# Patient Record
Sex: MALE | Race: WHITE | NOT HISPANIC OR LATINO | URBAN - METROPOLITAN AREA
[De-identification: names, ages, dates, MRNs, and addresses within clinical notes are randomized per-mention and may not be internally consistent; named-entity substitution may affect disease eponyms.]

---

## 2022-08-03 ENCOUNTER — OFFICE VISIT (OUTPATIENT)
Dept: FAMILY MEDICINE | Facility: CLINIC | Age: 47
End: 2022-08-03
Payer: COMMERCIAL

## 2022-08-03 VITALS
TEMPERATURE: 97.7 F | DIASTOLIC BLOOD PRESSURE: 73 MMHG | WEIGHT: 157.6 LBS | OXYGEN SATURATION: 97 % | BODY MASS INDEX: 23.89 KG/M2 | SYSTOLIC BLOOD PRESSURE: 114 MMHG | HEART RATE: 76 BPM | HEIGHT: 68 IN

## 2022-08-03 DIAGNOSIS — Z23 ENCOUNTER FOR IMMUNIZATION: ICD-10-CM

## 2022-08-03 DIAGNOSIS — L73.9 FOLLICULITIS: ICD-10-CM

## 2022-08-03 DIAGNOSIS — Z76.89 ENCOUNTER TO ESTABLISH CARE: Primary | ICD-10-CM

## 2022-08-03 DIAGNOSIS — Z00.00 HEALTHCARE MAINTENANCE: ICD-10-CM

## 2022-08-03 DIAGNOSIS — F64.0 GENDER DYSPHORIA IN ADULT: ICD-10-CM

## 2022-08-03 LAB
ALBUMIN SERPL BCG-MCNC: 4.4 G/DL (ref 3.5–5.2)
ALP SERPL-CCNC: 83 U/L (ref 35–129)
ALT SERPL W P-5'-P-CCNC: 31 U/L (ref 10–50)
ANION GAP SERPL CALCULATED.3IONS-SCNC: 10 MMOL/L (ref 7–15)
AST SERPL W P-5'-P-CCNC: 32 U/L (ref 10–50)
BILIRUB SERPL-MCNC: 0.3 MG/DL
BUN SERPL-MCNC: 16.2 MG/DL (ref 6–20)
CALCIUM SERPL-MCNC: 9.3 MG/DL (ref 8.6–10)
CHLORIDE SERPL-SCNC: 101 MMOL/L (ref 98–107)
CHOLEST SERPL-MCNC: 174 MG/DL
CREAT SERPL-MCNC: 0.7 MG/DL (ref 0.51–1.17)
DEPRECATED HCO3 PLAS-SCNC: 23 MMOL/L (ref 22–29)
ERYTHROCYTE [DISTWIDTH] IN BLOOD BY AUTOMATED COUNT: 13 % (ref 10–15)
ESTRADIOL SERPL-MCNC: 217 PG/ML
GFR SERPL CREATININE-BSD FRML MDRD: >90 ML/MIN/1.73M2
GLUCOSE SERPL-MCNC: 94 MG/DL (ref 70–99)
HBA1C MFR BLD: 5 % (ref 0–5.6)
HCT VFR BLD AUTO: 41 % (ref 35–53)
HDLC SERPL-MCNC: 61 MG/DL
HGB BLD-MCNC: 14 G/DL (ref 11.7–17.7)
HOLD SPECIMEN: NORMAL
LDLC SERPL CALC-MCNC: 100 MG/DL
MCH RBC QN AUTO: 33.3 PG (ref 26.5–33)
MCHC RBC AUTO-ENTMCNC: 34.1 G/DL (ref 31.5–36.5)
MCV RBC AUTO: 97 FL (ref 78–100)
NONHDLC SERPL-MCNC: 113 MG/DL
PLATELET # BLD AUTO: 250 10E3/UL (ref 150–450)
POTASSIUM SERPL-SCNC: 4 MMOL/L (ref 3.4–5.3)
PROT SERPL-MCNC: 7.4 G/DL (ref 6.4–8.3)
RBC # BLD AUTO: 4.21 10E6/UL (ref 3.8–5.9)
SODIUM SERPL-SCNC: 134 MMOL/L (ref 136–145)
TRIGL SERPL-MCNC: 63 MG/DL
WBC # BLD AUTO: 7 10E3/UL (ref 4–11)

## 2022-08-03 PROCEDURE — 83036 HEMOGLOBIN GLYCOSYLATED A1C: CPT | Performed by: STUDENT IN AN ORGANIZED HEALTH CARE EDUCATION/TRAINING PROGRAM

## 2022-08-03 PROCEDURE — 80053 COMPREHEN METABOLIC PANEL: CPT | Performed by: STUDENT IN AN ORGANIZED HEALTH CARE EDUCATION/TRAINING PROGRAM

## 2022-08-03 PROCEDURE — 82672 ASSAY OF ESTROGEN: CPT | Mod: 90 | Performed by: STUDENT IN AN ORGANIZED HEALTH CARE EDUCATION/TRAINING PROGRAM

## 2022-08-03 PROCEDURE — 86803 HEPATITIS C AB TEST: CPT | Performed by: STUDENT IN AN ORGANIZED HEALTH CARE EDUCATION/TRAINING PROGRAM

## 2022-08-03 PROCEDURE — 80061 LIPID PANEL: CPT | Performed by: STUDENT IN AN ORGANIZED HEALTH CARE EDUCATION/TRAINING PROGRAM

## 2022-08-03 PROCEDURE — 85027 COMPLETE CBC AUTOMATED: CPT | Performed by: STUDENT IN AN ORGANIZED HEALTH CARE EDUCATION/TRAINING PROGRAM

## 2022-08-03 PROCEDURE — 36415 COLL VENOUS BLD VENIPUNCTURE: CPT | Performed by: STUDENT IN AN ORGANIZED HEALTH CARE EDUCATION/TRAINING PROGRAM

## 2022-08-03 PROCEDURE — 84403 ASSAY OF TOTAL TESTOSTERONE: CPT | Mod: KX | Performed by: STUDENT IN AN ORGANIZED HEALTH CARE EDUCATION/TRAINING PROGRAM

## 2022-08-03 PROCEDURE — 90471 IMMUNIZATION ADMIN: CPT | Performed by: STUDENT IN AN ORGANIZED HEALTH CARE EDUCATION/TRAINING PROGRAM

## 2022-08-03 PROCEDURE — 82670 ASSAY OF TOTAL ESTRADIOL: CPT | Mod: KX | Performed by: STUDENT IN AN ORGANIZED HEALTH CARE EDUCATION/TRAINING PROGRAM

## 2022-08-03 PROCEDURE — 87389 HIV-1 AG W/HIV-1&-2 AB AG IA: CPT | Performed by: STUDENT IN AN ORGANIZED HEALTH CARE EDUCATION/TRAINING PROGRAM

## 2022-08-03 PROCEDURE — 99214 OFFICE O/P EST MOD 30 MIN: CPT | Mod: 25 | Performed by: STUDENT IN AN ORGANIZED HEALTH CARE EDUCATION/TRAINING PROGRAM

## 2022-08-03 PROCEDURE — 90715 TDAP VACCINE 7 YRS/> IM: CPT | Performed by: STUDENT IN AN ORGANIZED HEALTH CARE EDUCATION/TRAINING PROGRAM

## 2022-08-03 RX ORDER — ESTRADIOL VALERATE 20 MG/ML
20 INJECTION INTRAMUSCULAR WEEKLY
COMMUNITY
End: 2022-09-12

## 2022-08-03 NOTE — PATIENT INSTRUCTIONS
Thank you for coming in,     We'll check labs today. This will include your liver labs, I'll message you with results.     Dove plain unscented soaps, plain lotion like vanicream. We'll see how that helps.     Shayna Ash MD    .

## 2022-08-03 NOTE — PROGRESS NOTES
[facility] visit    Assessment and Plan     Blayne is a 47 year old other who presents with: Derm Problem (Pt noticed a rash in June which progressively got worse. Pt has tried hydrocortisone and it did not work. Pt reports tea tree oil helped bring down some of the inflammation. Rash is located in the torso, check and some in the back.)      Blayne was seen today for derm problem and to establish care.  Wanting baseline labs to note state of health, did have an episode 7 years ago where they drank heavily for about a year, has been worried about her liver ever since.    Diagnoses and all orders for this visit:    Encounter to establish care  Labs pending.  -     Comprehensive metabolic panel; Future  -     CBC with Platelets and Reflex to Iron Studies; Future  -     Lipid panel reflex to direct LDL Non-fasting; Future  -     Hepatitis C Screen Reflex to HCV RNA Quant and Genotype; Future  -     HIV Antigen Antibody Combo; Future  -     Hemoglobin A1c; Future  -     Cancel: Estrogens total; Future  -     Testosterone total; Future  -     Comprehensive metabolic panel  -     CBC with Platelets and Reflex to Iron Studies  -     Lipid panel reflex to direct LDL Non-fasting  -     Hepatitis C Screen Reflex to HCV RNA Quant and Genotype  -     HIV Antigen Antibody Combo  -     Hemoglobin A1c  -     Estrogens total  -     Testosterone total    Healthcare maintenance    Gender dysphoria in adult  Patient is sometimes using the name Kiki, has started estrogen in July with an online provider.  No laboratory tests on file.  -     Hemoglobin A1c; Future  -     Cancel: Estrogens total; Future  -     Testosterone total; Future  -     Hemoglobin A1c  -     Estrogens total  -     Testosterone total  -     Estradiol; Future    Encounter for immunization  -     TDAP VACCINE (Adacel, Boostrix)  [[id number]]    Folliculitis  Mild skin irritation consistent with folliculitis, scattered isolated papules without surrounding erythema,  signs of excoriation.  Most consistent with body acne versus folliculitis.  Discussed use of hypoallergenic soaps, unscented lotions like Vanicream.  She has already removed perfumes and dyes from her washing and soaps however was recently uses scented body lotion.  This may also in part be a response to starting hormones, will monitor for response and change in regimen.      No follow-ups on file.    Options for treatment and follow-up care were reviewed with the patient/caregivers. They engaged in the decision making process and verbalized understanding of the options discussed and agreed with the final plan.    There are no discontinued medications.    SANDEEP Sosa MD PG3      Subjective      History obtained from patient, who has not been consistently seeking care for the last 20 years so JORGE ALBERTO was not collected  Patient speaks English,  was not present for this visit.      DWIGHT Noyola is a 47 year old adult, who presents with:  Chief Complaint   Patient presents with     Derm Problem     Pt noticed a rash in June which progressively got worse. Pt has tried hydrocortisone and it did not work. Pt reports tea tree oil helped bring down some of the inflammation. Rash is located in the torso, check and some in the back.     Patient establishing care.    Significant history of anxiety, currently feels that it is fairly well managed.  Is doing a lot better since starting estrogen a on July 4.  Already seeing some skin and chest changes.  Is getting this care through an alternate source.  Mom is moderately supportive, dad is hostile and also has Alzheimer's which is changing his behaviors.  Patient is working evening shift which is quite a relief, previously been working overnights.    Patient is a new patient to this clinic and so I reviewed and updated the problem, medication and allergy lists, as well as past, surgical, family and social history.      There is no problem list on file for this  "patient.    Current Outpatient Medications   Medication     estradiol valerate (DELESTROGEN) 20 MG/ML injection     No current facility-administered medications for this visit.        Allergies   Allergen Reactions     Fish-Derived Products Anaphylaxis and Hives     pt states   Lou Fox RN...............6/11/2020 5:25 PM       History reviewed. No pertinent past medical history.  History reviewed. No pertinent surgical history.    Social History     Socioeconomic History     Marital status: Single     Spouse name: None     Number of children: None     Years of education: None     Highest education level: None        ROS  10-point ROS negative except as described above.      Objective     Vital signs  /73   Pulse 76   Temp 97.7  F (36.5  C) (Oral)   Ht 1.726 m (5' 7.95\")   Wt 71.5 kg (157 lb 9.6 oz)   SpO2 97%   BMI 24.00 kg/m      Vitals were reviewed and were normal     PE  Vital signs reviewed  Constitutional: Age appropriate human well kempt, no acute distress  HENT: Sclera non-icteric and not injected, pink conjunctivae  Cardiac: Regular rate  Resp: Speaking in full sentences on room air, no respiratory distress  Skin: Warm, dry, Mild skin irritation consistent with folliculitis, scattered isolated papules without surrounding erythema, signs of excoriation.   Msk: Ambulates independently, full range of gesture  Neuro: Alert and oriented, movements coordinated and symmetric, appropriate gait  Psych: Affect appropriate to conversation and situation. Initially anxious, improves with visit.       Results    Results from this or last visit  Results for orders placed or performed in visit on 08/03/22   Hemoglobin A1c     Status: Normal   Result Value Ref Range    Hemoglobin A1C 5.0 0.0 - 5.6 %   Extra Green Top (Lithium Heparin) Tube     Status: None   Result Value Ref Range    Hold Specimen JIC    CBC with Platelets and Reflex to Iron Studies     Status: None (In process)    Narrative    The " following orders were created for panel order CBC with Platelets and Reflex to Iron Studies.  Procedure                               Abnormality         Status                     ---------                               -----------         ------                     CBC with Platelets and R...[[id number]]                      In process                 Extra Green Top (Lithium...[[id number]]                      Final result                 Please view results for these tests on the individual orders.

## 2022-08-03 NOTE — LETTER
August 8, 2022      DWIGHT Noyola  [address] [place] [address]        Dear  Jazmín,    We are writing to inform you of your test results.    To start with, your kidney and liver function both look good and healthy.  Your cholesterol is within desirable limits which suggests to me that you are getting good food and moving your body well enough to keep you quite healthy.  You are negative for HIV, hepatitis C so there is no treatment that needs to be done here.    Your testosterone is suppressed and your estradiol is within goal range.    I know you are very anxious about making sure that you are healthy, you have done very well at keeping herself healthy at least according to the things that we measure in your blood in the lab.  I hope this is some reassurance for you, we are here whenever you have questions or other concerns.    Resulted Orders   Comprehensive metabolic panel   Result Value Ref Range    Sodium 134 (L) 136 - 145 mmol/L    Potassium 4.0 3.4 - 5.3 mmol/L    Creatinine 0.70 0.51 - 1.17 mg/dL      Comment:      Male and Female  0-2 Months    0.31-0.88 mg/dL  2-12 Months   0.16-0.39 mg/dL  1-2 Years     0.18-0.35 mg/dL  3-4 Years     0.26-0.42 mg/dL  5-6 Years     0.29-0.47 mg/dL  7-8 Years     0.34-0.53 mg/dL  9-10 Years    0.33-0.64 mg/dL  11-12 Years   0.44-0.68 mg/dL  13-14 Years   0.46-0.77 mg/dL    Female  15 Years and older  0.51-0.95 mg/dL    Male  15 Years and older  0.67-1.17 mg/dL        Urea Nitrogen 16.2 6.0 - 20.0 mg/dL    Chloride 101 98 - 107 mmol/L    Carbon Dioxide (CO2) 23 22 - 29 mmol/L    Anion Gap 10 7 - 15 mmol/L    Glucose 94 70 - 99 mg/dL    Calcium 9.3 8.6 - 10.0 mg/dL    Protein Total 7.4 6.4 - 8.3 g/dL    Albumin 4.4 3.5 - 5.2 g/dL    Bilirubin Total 0.3 <=1.2 mg/dL    Alkaline Phosphatase 83 35 - 129 U/L      Comment:      Female:    0-15 days     U/L  15d-1 year   122-469 U/L  1-10 years   142-335 U/L  10-13 years  129-417 U/L  13-15 years   U/L  15-17  years   U/L  17-19 years  45-87 U/L  19 years and older   U/L      Male:  0-15 days     U/L  15d-1 year   122-469 U/L  1-10 years   142-335 U/L  10-13 years  129-417 U/L  13-15 years  116-468 U/L  15-17 years   U/L  17-19 years   U/L  19 years and older   U/L      AST 32 10 - 50 U/L      Comment:      Female   All ages 10-35 U/L     Male   All ages 10-50 U/L        ALT 31 10 - 50 U/L      Comment:      Female   All ages 10-35 U/L     Male   All ages 10-50 U/L        GFR Estimate >90 >60 mL/min/1.73m2      Comment:      GFR not calculated when sex unspecified or nonbinary.  Effective December 21, 2021 eGFRcr in adults is calculated using the 2021 CKD-EPI creatinine equation which includes age and gender (Ivan et al., NEJ, DOI: 10.1056/IACKvj7748865)    Narrative    The sex of this patient cannot be reliably determined based on discrepancies in demographics (legal sex, sex assigned at birth, gender identity).  Both male and female reference ranges are provided where applicable.  Careful evaluation of the patient s results as compared to the gender specific reference intervals is required in this setting.    Lipid panel reflex to direct LDL Non-fasting   Result Value Ref Range    Cholesterol 174 <200 mg/dL    Triglycerides 63 <150 mg/dL    Direct Measure HDL 61 >=40 mg/dL    LDL Cholesterol Calculated 100 <=100 mg/dL    Non HDL Cholesterol 113 <130 mg/dL    Narrative    Cholesterol  Desirable:  <200 mg/dL    Triglycerides  Normal:  Less than 150 mg/dL  Borderline High:  150-199 mg/dL  High:  200-499 mg/dL  Very High:  Greater than or equal to 500 mg/dL    Direct Measure HDL  Female:  Greater than or equal to 50 mg/dL   Male:  Greater than or equal to 40 mg/dL    LDL Cholesterol  Desirable:  <100mg/dL  Above Desirable:  100-129 mg/dL   Borderline High:  130-159 mg/dL   High:  160-189 mg/dL   Very High:  >= 190 mg/dL    Non HDL Cholesterol  Desirable:  130 mg/dL  Above Desirable:   130-159 mg/dL  Borderline High:  160-189 mg/dL  High:  190-219 mg/dL  Very High:  Greater than or equal to 220 mg/dL   Hepatitis C Screen Reflex to HCV RNA Quant and Genotype   Result Value Ref Range    Hepatitis C Antibody Nonreactive Nonreactive    Narrative    Assay performance characteristics have not been established for newborns, infants, and children.   HIV Antigen Antibody Combo   Result Value Ref Range    HIV Antigen Antibody Combo Nonreactive Nonreactive      Comment:      HIV-1 p24 Ag & HIV-1/HIV-2 Ab Not Detected   Hemoglobin A1c   Result Value Ref Range    Hemoglobin A1C 5.0 0.0 - 5.6 %      Comment:      Normal <5.7%   Prediabetes 5.7-6.4%    Diabetes 6.5% or higher     Note: Adopted from ADA consensus guidelines.   Estrogens total   Result Value Ref Range    See Scanned Result ESTROGENS TOTAL-Scanned (A)    Testosterone total   Result Value Ref Range    Testosterone Total 10 8 - 950 ng/dL    Narrative    The sex of this patient cannot be reliably determined based on discrepancies in demographics (legal sex, sex assigned at birth, gender identity).  Both male and female reference ranges are provided where applicable.  Careful evaluation of the patient s results as compared to the gender specific reference intervals is required in this setting.    CBC with Platelets and Reflex to Iron Studies   Result Value Ref Range    WBC Count 7.0 4.0 - 11.0 10e3/uL    RBC Count 4.21 3.80 - 5.90 10e6/uL      Comment:      Sex Specific Reference Ranges:    Female  3.80-5.20  10e6/uL  Male    4.40-5.90  10e6/uL        Hemoglobin 14.0 11.7 - 17.7 g/dL      Comment:      Sex Specific Reference Ranges:    Female  11.7-15.7  g/dL  Male    13.3-17.7   g/dL      Hematocrit 41.0 35.0 - 53.0 %      Comment:      Sex Specific Reference Ranges:     Female  35.0-47.0 %   Male      40.0-53.0 %      MCV 97 78 - 100 fL    MCH 33.3 (H) 26.5 - 33.0 pg    MCHC 34.1 31.5 - 36.5 g/dL    RDW 13.0 10.0 - 15.0 %    Platelet Count 250 150 -  450 10e3/uL    Narrative    The sex of this patient cannot be reliably determined based on discrepancies in demographics (legal sex, sex assigned at birth, gender identity).  Both male and female reference ranges are provided where applicable.  Careful evaluation of the patient s results as compared to the gender specific reference intervals is required in this setting.    Extra Green Top (Lithium Heparin) Tube   Result Value Ref Range    Hold Specimen JI    Estradiol   Result Value Ref Range    Estradiol 217 pg/mL      Comment:      Healthy Men:   11.3-43.2 pg/mL    Healthy Postmenopausal Women:  Postmenopause: <5-138 pg/mL    Healthy Pregnant Women:  1st trimester: 154-3243 pg/mL  2nd trimester: 1561-65995 pg/mL  3rd trimester: 8525->93346 pg/mL    Healthy Women Cycle Phase:  Follicular: 30.9-90.4 pg/mL  Ovulation: 60.4-533 pg/mL  Luteal: 60.4-232 pg/mL    Healthy Women Cycle Sub-Phase:  Early Follicular: 20.5-62.8 pg/mL  Intermediate Follicular: 26-79.8 pg/mL  Late Follicular: 49.5-233 pg/mL  Ovulation: 60.4-602 pg/mL  Early Luteal: 51.1-179 pg/mL  Intermediate Luteal: 66.5-305 pg/mL  Late Luteal: 30.2-222 pg/mL       If you have any questions or concerns, please call the clinic at the number listed above.       Sincerely,      Shayna Sosa MD

## 2022-08-04 LAB
HCV AB SERPL QL IA: NONREACTIVE
HIV 1+2 AB+HIV1 P24 AG SERPL QL IA: NONREACTIVE

## 2022-08-05 LAB — TESTOST SERPL-MCNC: 10 NG/DL (ref 8–950)

## 2022-08-08 LAB — SCANNED LAB RESULT: ABNORMAL

## 2022-08-28 ENCOUNTER — HEALTH MAINTENANCE LETTER (OUTPATIENT)
Age: 47
End: 2022-08-28

## 2022-09-09 ENCOUNTER — TELEPHONE (OUTPATIENT)
Dept: FAMILY MEDICINE | Facility: CLINIC | Age: 47
End: 2022-09-09

## 2022-09-09 ENCOUNTER — MYC MEDICAL ADVICE (OUTPATIENT)
Dept: FAMILY MEDICINE | Facility: CLINIC | Age: 47
End: 2022-09-09

## 2022-09-09 DIAGNOSIS — F64.0 GENDER DYSPHORIA IN ADULT: Primary | ICD-10-CM

## 2022-09-09 NOTE — TELEPHONE ENCOUNTER
"[facility] phone call message- patient requesting a refill:    Full Medication Name: 25g x 1\"  Needle       Pharmacy confirmed as     [id number] - [place]  [facility] & [address] [place] [address]  Phone: [phone] Fax: [phone]    : Yes    Additional Comments: Dr Sosa have not prescribed this before. The patient used to get the needles from another source but now the patient will like to only get them from Dr Sosa. The patient is out as of right now.    OK to leave a message on voice mail? Yes    Primary language: English      needed? No    Call taken on September 9, 2022 at 8:34 AM by Abbey Steward  "

## 2022-09-12 ENCOUNTER — OFFICE VISIT (OUTPATIENT)
Dept: FAMILY MEDICINE | Facility: CLINIC | Age: 47
End: 2022-09-12
Payer: COMMERCIAL

## 2022-09-12 VITALS
BODY MASS INDEX: 23.73 KG/M2 | HEART RATE: 77 BPM | WEIGHT: 160.2 LBS | OXYGEN SATURATION: 98 % | DIASTOLIC BLOOD PRESSURE: 75 MMHG | SYSTOLIC BLOOD PRESSURE: 119 MMHG | TEMPERATURE: 98.2 F | HEIGHT: 69 IN

## 2022-09-12 DIAGNOSIS — F64.0 GENDER DYSPHORIA IN ADULT: Primary | ICD-10-CM

## 2022-09-12 DIAGNOSIS — Z23 NEED FOR PROPHYLACTIC VACCINATION AND INOCULATION AGAINST INFLUENZA: ICD-10-CM

## 2022-09-12 PROCEDURE — 99214 OFFICE O/P EST MOD 30 MIN: CPT | Mod: 25 | Performed by: STUDENT IN AN ORGANIZED HEALTH CARE EDUCATION/TRAINING PROGRAM

## 2022-09-12 PROCEDURE — 90471 IMMUNIZATION ADMIN: CPT | Performed by: STUDENT IN AN ORGANIZED HEALTH CARE EDUCATION/TRAINING PROGRAM

## 2022-09-12 PROCEDURE — 90686 IIV4 VACC NO PRSV 0.5 ML IM: CPT | Performed by: STUDENT IN AN ORGANIZED HEALTH CARE EDUCATION/TRAINING PROGRAM

## 2022-09-12 RX ORDER — ESTRADIOL VALERATE 20 MG/ML
6 INJECTION INTRAMUSCULAR WEEKLY
COMMUNITY
Start: 2022-09-12 | End: 2022-09-23

## 2022-09-12 RX ORDER — SYRINGE WITH NEEDLE, 1 ML 25GX5/8"
1 SYRINGE, EMPTY DISPOSABLE MISCELLANEOUS WEEKLY
COMMUNITY
Start: 2022-06-29 | End: 2023-10-18

## 2022-09-12 RX ORDER — NEEDLES, DISPOSABLE 25GX5/8"
1 NEEDLE, DISPOSABLE MISCELLANEOUS WEEKLY
COMMUNITY
Start: 2022-06-29 | End: 2022-09-12

## 2022-09-12 RX ORDER — NEEDLES, DISPOSABLE 25GX5/8"
1 NEEDLE, DISPOSABLE MISCELLANEOUS WEEKLY
Qty: 100 EACH | Refills: 3 | Status: SHIPPED | OUTPATIENT
Start: 2022-09-12 | End: 2023-10-18

## 2022-09-12 RX ORDER — NEEDLES, DISPOSABLE 25GX5/8"
NEEDLE, DISPOSABLE MISCELLANEOUS
Qty: 100 EACH | Refills: 3 | Status: SHIPPED | OUTPATIENT
Start: 2022-09-12

## 2022-09-12 NOTE — PROGRESS NOTES
Preceptor Attestation:    I discussed the patient with the resident and evaluated the patient in person. I have verified the content of the note, which accurately reflects my assessment of the patient and the plan of care.   Supervising Physician:  Kika Moore DO.

## 2022-09-12 NOTE — PATIENT INSTRUCTIONS
It was maicol to see you today!    I refilled all your medications, just let me know if anything needs fixing or is not what you expect.  Would like to at which time we will check labs again and check in on how you are feeling after this amount of time on your medication.    I am so glad that dinner yesterday went well and that things are going well with your mom!  Please let us know if you have any other questions or anything else comes up.    Shayna Ash MD

## 2022-09-12 NOTE — PROGRESS NOTES
"  Assessment & Plan     Gender dysphoria in adult  Patient presenting to establish care.  Reviewed medications and dosages, corrected dose of estradiol valerate which is 0.3 cc/week or approximately 6 mg.  Patient is not having any difficulty with injections.  At this time is interested in just staying on current regimen, we did discuss when in the course of a transition 1 might introduce an androgen blocker or progesterone, patient is not in a hurry.  We discussed the timeline of HRT which is in terms of puberty so over the course of months to years.  All questions were answered, I should see her in about 2 months for labs and in person visit.  - BD HYPODERMIC NEEDLE 18G X 1\" MISC; Inject 1 Syringe into the muscle once a week  - syringe, disposable, (BioCatch SYRINGE LUER LOCK) 1 ML MISC; Use for weekly estrogen injections    Need for prophylactic vaccination and inoculation against influenza  Patient is due for flu vaccination, this was provided.  - INFLUENZA VACCINE IM > 6 MONTHS VALENT IIV4 (AFLURIA/FLUZONE)      Return in about 2 months (around 11/12/2022).    Shayna Ash MD  [facility] RAYSA Lopez is a 47 year old, presenting for the following health issues:  RECHECK (Pt present for gender affirming care)      GAYE Swnason is presenting to finish establishing care with our clinic.  She will be getting her gender services as well as her HRT through us.  Yesterday she went to a family event dressed and is Kiki and everything went well, her mother is now calling her Kiki also.  She is pleased to be established at this clinic and is very invested in protecting her health as long as possible especially now that she is transitioning.    Review of Systems   See HPI      Objective    /75   Pulse 77   Temp 98.2  F (36.8  C) (Oral)   Ht 1.746 m (5' 8.74\")   Wt 72.7 kg (160 lb 3.2 oz)   SpO2 98%   BMI 23.84 kg/m    Body mass index is 23.84 " kg/m .  Physical Exam   Vital signs reviewed  Constitutional: Age appropriate human well kempt, no acute distress, well-groomed with excellent jewelry and earrings  HENT: Sclera non-icteric and not injected, pink conjunctivae  Cardiac: Regular rate  Resp: Speaking in full sentences on room air, no respiratory distress   Skin: Warm, dry   Msk: Ambulates independently, full range of gesture  Neuro: Alert and oriented, movements coordinated and symmetric, appropriate gait  Psych: Affect appropriate to conversation and situation       Office Visit on 08/03/2022   Component Date Value Ref Range Status     Sodium 08/03/2022 134 (A) 136 - 145 mmol/L Final     Potassium 08/03/2022 4.0  3.4 - 5.3 mmol/L Final     Creatinine 08/03/2022 0.70  0.51 - 1.17 mg/dL Final    Male and Female  0-2 Months    0.31-0.88 mg/dL  2-12 Months   0.16-0.39 mg/dL  1-2 Years     0.18-0.35 mg/dL  3-4 Years     0.26-0.42 mg/dL  5-6 Years     0.29-0.47 mg/dL  7-8 Years     0.34-0.53 mg/dL  9-10 Years    0.33-0.64 mg/dL  11-12 Years   0.44-0.68 mg/dL  13-14 Years   0.46-0.77 mg/dL    Female  15 Years and older  0.51-0.95 mg/dL    Male  15 Years and older  0.67-1.17 mg/dL         Urea Nitrogen 08/03/2022 16.2  6.0 - 20.0 mg/dL Final     Chloride 08/03/2022 101  98 - 107 mmol/L Final     Carbon Dioxide (CO2) 08/03/2022 23  22 - 29 mmol/L Final     Anion Gap 08/03/2022 10  7 - 15 mmol/L Final     Glucose 08/03/2022 94  70 - 99 mg/dL Final     Calcium 08/03/2022 9.3  8.6 - 10.0 mg/dL Final     Protein Total 08/03/2022 7.4  6.4 - 8.3 g/dL Final     Albumin 08/03/2022 4.4  3.5 - 5.2 g/dL Final     Bilirubin Total 08/03/2022 0.3  <=1.2 mg/dL Final     Alkaline Phosphatase 08/03/2022 83  35 - 129 U/L Final    Female:    0-15 days     U/L  15d-1 year   122-469 U/L  1-10 years   142-335 U/L  10-13 years  129-417 U/L  13-15 years   U/L  15-17 years   U/L  17-19 years  45-87 U/L  19 years and older   U/L      Male:  0-15 days      U/L  15d-1 year   122-469 U/L  1-10 years   142-335 U/L  10-13 years  129-417 U/L  13-15 years  116-468 U/L  15-17 years   U/L  17-19 years   U/L  19 years and older   U/L       AST 08/03/2022 32  10 - 50 U/L Final    Female   All ages 10-35 U/L     Male   All ages 10-50 U/L         ALT 08/03/2022 31  10 - 50 U/L Final    Female   All ages 10-35 U/L     Male   All ages 10-50 U/L         GFR Estimate 08/03/2022 >90  >60 mL/min/1.73m2 Final    GFR not calculated when sex unspecified or nonbinary.  Effective December 21, 2021 eGFRcr in adults is calculated using the 2021 CKD-EPI creatinine equation which includes age and gender (Ivan et al., NE, DOI: 10.1056/SYSXii9129721)     Cholesterol 08/03/2022 174  <200 mg/dL Final     Triglycerides 08/03/2022 63  <150 mg/dL Final     Direct Measure HDL 08/03/2022 61  >=40 mg/dL Final     LDL Cholesterol Calculated 08/03/2022 100  <=100 mg/dL Final     Non HDL Cholesterol 08/03/2022 113  <130 mg/dL Final     Hepatitis C Antibody 08/03/2022 Nonreactive  Nonreactive Final     HIV Antigen Antibody Combo 08/03/2022 Nonreactive  Nonreactive Final    HIV-1 p24 Ag & HIV-1/HIV-2 Ab Not Detected     Hemoglobin A1C 08/03/2022 5.0  0.0 - 5.6 % Final    Normal <5.7%   Prediabetes 5.7-6.4%    Diabetes 6.5% or higher     Note: Adopted from ADA consensus guidelines.     See Scanned Result 08/03/2022 ESTROGENS TOTAL-Scanned (A)  Final     Testosterone Total 08/03/2022 10  8 - 950 ng/dL Final     WBC Count 08/03/2022 7.0  4.0 - 11.0 10e3/uL Final     RBC Count 08/03/2022 4.21  3.80 - 5.90 10e6/uL Final    Sex Specific Reference Ranges:    Female  3.80-5.20  10e6/uL  Male    4.40-5.90  10e6/uL         Hemoglobin 08/03/2022 14.0  11.7 - 17.7 g/dL Final    Sex Specific Reference Ranges:    Female  11.7-15.7  g/dL  Male    13.3-17.7   g/dL       Hematocrit 08/03/2022 41.0  35.0 - 53.0 % Final    Sex Specific Reference Ranges:     Female  35.0-47.0 %   Male      40.0-53.0 %        MCV 08/03/2022 97  78 - 100 fL Final     MCH 08/03/2022 33.3 (A) 26.5 - 33.0 pg Final     MCHC 08/03/2022 34.1  31.5 - 36.5 g/dL Final     RDW 08/03/2022 13.0  10.0 - 15.0 % Final     Platelet Count 08/03/2022 250  150 - 450 10e3/uL Final     Hold Specimen 08/03/2022 JIC   Final     Estradiol 08/03/2022 217  pg/mL Final    Healthy Men:   11.3-43.2 pg/mL    Healthy Postmenopausal Women:  Postmenopause: <5-138 pg/mL    Healthy Pregnant Women:  1st trimester: 154-3243 pg/mL  2nd trimester: 1561-36840 pg/mL  3rd trimester: 8525->59016 pg/mL    Healthy Women Cycle Phase:  Follicular: 30.9-90.4 pg/mL  Ovulation: 60.4-533 pg/mL  Luteal: 60.4-232 pg/mL    Healthy Women Cycle Sub-Phase:  Early Follicular: 20.5-62.8 pg/mL  Intermediate Follicular: 26-79.8 pg/mL  Late Follicular: 49.5-233 pg/mL  Ovulation: 60.4-602 pg/mL  Early Luteal: 51.1-179 pg/mL  Intermediate Luteal: 66.5-305 pg/mL  Late Luteal: 30.2-222 pg/mL

## 2023-02-06 DIAGNOSIS — F64.0 GENDER DYSPHORIA IN ADULT: ICD-10-CM

## 2023-02-06 NOTE — TELEPHONE ENCOUNTER
"Request for medication refill:  estradiol valerate (DELESTROGEN) 20 MG/ML injection    Providers if patient needs an appointment and you are willing to give a one month supply please refill for one month and  send a letter/MyChart using \".SMILLIMITEDREFILL\" .smillimited and route chart to \"P SMI \" (Giving one month refill in non controlled medications is strongly recommended before denial)    If refill has been denied, meaning absolutely no refills without visit, please complete the smart phrase \".smirxrefuse\" and route it to the \"P SMI MED REFILLS\"  pool to inform the patient and the pharmacy.    Marshal Westfall MA        "

## 2023-02-07 DIAGNOSIS — F64.0 GENDER DYSPHORIA IN ADULT: ICD-10-CM

## 2023-02-08 RX ORDER — ESTRADIOL VALERATE 20 MG/ML
INJECTION INTRAMUSCULAR
Qty: 5 ML | Refills: 1 | Status: SHIPPED | OUTPATIENT
Start: 2023-02-08 | End: 2023-10-18

## 2023-02-16 RX ORDER — ESTRADIOL VALERATE 20 MG/ML
INJECTION INTRAMUSCULAR
Qty: 5 ML | Refills: 1 | OUTPATIENT
Start: 2023-02-16

## 2023-02-24 ENCOUNTER — OFFICE VISIT (OUTPATIENT)
Dept: FAMILY MEDICINE | Facility: CLINIC | Age: 48
End: 2023-02-24
Payer: COMMERCIAL

## 2023-02-24 VITALS
HEIGHT: 68 IN | WEIGHT: 169 LBS | OXYGEN SATURATION: 100 % | RESPIRATION RATE: 18 BRPM | HEART RATE: 64 BPM | TEMPERATURE: 97.9 F | DIASTOLIC BLOOD PRESSURE: 74 MMHG | BODY MASS INDEX: 25.61 KG/M2 | SYSTOLIC BLOOD PRESSURE: 114 MMHG

## 2023-02-24 DIAGNOSIS — Z51.81 ENCOUNTER FOR THERAPEUTIC DRUG MONITORING: ICD-10-CM

## 2023-02-24 DIAGNOSIS — F64.0 GENDER DYSPHORIA IN ADULT: Primary | ICD-10-CM

## 2023-02-24 DIAGNOSIS — Z83.49 FAMILY HISTORY OF HEMOCHROMATOSIS: ICD-10-CM

## 2023-02-24 LAB
ALBUMIN SERPL BCG-MCNC: 4.2 G/DL (ref 3.5–5.2)
ALP SERPL-CCNC: 64 U/L (ref 35–129)
ALT SERPL W P-5'-P-CCNC: 25 U/L (ref 10–50)
ANION GAP SERPL CALCULATED.3IONS-SCNC: 9 MMOL/L (ref 7–15)
AST SERPL W P-5'-P-CCNC: 26 U/L (ref 10–50)
BILIRUB SERPL-MCNC: 0.2 MG/DL
BUN SERPL-MCNC: 16.6 MG/DL (ref 6–20)
CALCIUM SERPL-MCNC: 9.6 MG/DL (ref 8.6–10)
CHLORIDE SERPL-SCNC: 102 MMOL/L (ref 98–107)
CREAT SERPL-MCNC: 0.75 MG/DL (ref 0.51–1.17)
DEPRECATED HCO3 PLAS-SCNC: 25 MMOL/L (ref 22–29)
ESTRADIOL SERPL-MCNC: 523 PG/ML
FERRITIN SERPL-MCNC: 279 NG/ML (ref 6–409)
GFR SERPL CREATININE-BSD FRML MDRD: >90 ML/MIN/1.73M2
GLUCOSE SERPL-MCNC: 99 MG/DL (ref 70–99)
HGB BLD-MCNC: 13.5 G/DL (ref 11.7–17.7)
POTASSIUM SERPL-SCNC: 4.7 MMOL/L (ref 3.4–5.3)
PROT SERPL-MCNC: 7.3 G/DL (ref 6.4–8.3)
SODIUM SERPL-SCNC: 136 MMOL/L (ref 136–145)

## 2023-02-24 PROCEDURE — 99213 OFFICE O/P EST LOW 20 MIN: CPT | Mod: GC | Performed by: STUDENT IN AN ORGANIZED HEALTH CARE EDUCATION/TRAINING PROGRAM

## 2023-02-24 PROCEDURE — 84403 ASSAY OF TOTAL TESTOSTERONE: CPT | Mod: KX | Performed by: STUDENT IN AN ORGANIZED HEALTH CARE EDUCATION/TRAINING PROGRAM

## 2023-02-24 PROCEDURE — 80053 COMPREHEN METABOLIC PANEL: CPT | Performed by: STUDENT IN AN ORGANIZED HEALTH CARE EDUCATION/TRAINING PROGRAM

## 2023-02-24 PROCEDURE — 82670 ASSAY OF TOTAL ESTRADIOL: CPT | Mod: KX | Performed by: STUDENT IN AN ORGANIZED HEALTH CARE EDUCATION/TRAINING PROGRAM

## 2023-02-24 PROCEDURE — 36415 COLL VENOUS BLD VENIPUNCTURE: CPT | Performed by: STUDENT IN AN ORGANIZED HEALTH CARE EDUCATION/TRAINING PROGRAM

## 2023-02-24 PROCEDURE — 85018 HEMOGLOBIN: CPT | Performed by: STUDENT IN AN ORGANIZED HEALTH CARE EDUCATION/TRAINING PROGRAM

## 2023-02-24 PROCEDURE — 82728 ASSAY OF FERRITIN: CPT | Performed by: STUDENT IN AN ORGANIZED HEALTH CARE EDUCATION/TRAINING PROGRAM

## 2023-02-24 NOTE — PROGRESS NOTES
Assessment & Plan     Gender dysphoria in adult  Therapeutic Drug Monitoring   Is on a stable dose of 0.3ml estradiol injections weekly. Has no issues getting her medications and has no concerning side effects. Last labs 8/22 showed estradiol 217, testosterone 10, Na 134, MCH 33.3, everything else wnl. Will check E and T lab today as part of monitoring E, then if remains stable would space out visits to 6 mo from now or as needed. Discussed referral to comprehensive gender clinic for consult with surgery on orchiectomy when ready. Also discussed YouTube videos for voice therapy. Discussed when to initiate progesterone, likely around 18 months to maximize development. No other questions or concerns at this time.   - Estradiol; Future  - Testosterone total; Future  - Comprehensive metabolic panel; Future  - Hemoglobin; Future  - Estradiol  - Testosterone total  - Comprehensive metabolic panel  - Hemoglobin    Family history of hemochromatosis  Has a family history of hemochromatosis but no personal history or symptoms herself. Last labs 8/22 showed normal Hgb and liver enzymes. Unlikely that she has hemochromatosis but will check labs and continue to monitor with HRT labs.   - Comprehensive metabolic panel; Future  - Hemoglobin; Future  - Ferritin; Future  - Comprehensive metabolic panel  - Hemoglobin  - Ferritin    No follow-ups on file.     Dominick Barragan, MS3    I, Shayna Ash MD, saw the patient with the medical student and have edited the note to reflect our combined findings. I agree with the history, exam, assessment and plan as outlined above and have discussed this patient with primary care team and attending.      Shayna Ash MD  [facility] RAYSA Lopez is a 47 year old, presenting for the following health issues: follow-up on HRT    HPI        HPI   Jessica is a 47 year old individual that uses pronouns she/her that presents today for follow up of:  "feminizing hormone therapy. She has been on 20mg/ml injections of estradiol since July 1st. Has noticed breast growth, face has filled out more, reduced and finer hairs, more fat distrubution in thighs and hips. Goals for body changes are more breast development and orchiectomy. She also brought up a family history of Hemachromatosis that she wanted providers to be aware of. At work, has not faced any hostility in regards to her identity. Mentions that she is the only transgender person she knows and is hopeful of finding a community sometime.      Gender identity: female, \"woman who happens to be transgender\"  Any special concerns today? No concerns  On hormones?  estradiol  Gender affirmation is being sought in these other ways: thinking of voice therapy (doing some on own), wants laser hair removal eventually but has never been too hairy, working to change things legally, wants to find a trans community     No past surgical history on file.    Patient Active Problem List   Diagnosis     Gender dysphoria in adult       Current Outpatient Medications   Medication Sig Dispense Refill     B-D LUER-JEFFREY SYRINGE 25G X 1\" 3 ML MISC Inject 1 Syringe into the muscle once a week       BD HYPODERMIC NEEDLE 18G X 1\" MISC Inject 1 Syringe into the muscle once a week 100 each 3     estradiol valerate (DELESTROGEN) 20 MG/ML injection INJECT 0.3 ML INTO THE MUSCLE ONCE A WEEK 5 mL 1     Needle, Disp, (B-D DISP NEEDLE) 25G X 1\" MISC Use to weekly inject estrogen 100 each 3     syringe, disposable, (CAREPOINT SYRINGE LUER LOCK) 1 ML MISC Use for weekly estrogen injections 100 each 3       History   Smoking Status     Former     Types: Cigarettes   Smokeless Tobacco     Never            Review of Systems:        General    Fat redistribution: more in face, legs, thighs, hips    Weight change: some weight, normal during the winter HEENT    Voice change: none     Cardiovascular (CV)    Chest Pains: no    Shortness of breath: no " Chest    Decreased exercise tolerance:  no    Breast changes/development: yes     Gastrointestinal (GI)    Abdominal pain: no    Change in appetite: no Skin    Acne or oily skin: less oily    Change in hair: finer, less hairs     Genitourinary ()    Decreased spontaneous erections: no changes    Change in libido: decreased    New sexual partners: no Musculoskeletal    Leg pain or swelling: no     Psychiatric (Psych)    Depression: no    Anxiety/Panic: no    Mood: feeling better since starting HRT               Review of Systems   Constitutional, HEENT, cardiovascular, pulmonary, gi and gu systems are negative, except as otherwise noted.      Objective    There were no vitals taken for this visit.  There is no height or weight on file to calculate BMI.  Physical Exam   GENERAL: healthy, alert and no distress  RESP: breathing well on RA  MS: no gross musculoskeletal defects noted

## 2023-02-24 NOTE — PATIENT INSTRUCTIONS
Hetal Funes, she/they  Intake and Referral Coordinator  [facility]   [phone]    Call and ask to get connected to a surgeon for orchiectomy.

## 2023-02-24 NOTE — PROGRESS NOTES
Preceptor Attestation:   Patient seen, evaluated and discussed with the resident. I have verified the content of the note, which accurately reflects my assessment of the patient and the plan of care.   Supervising Physician:  Mike Romero MD

## 2023-02-26 LAB — TESTOST SERPL-MCNC: 8 NG/DL (ref 8–950)

## 2023-04-28 ENCOUNTER — LAB (OUTPATIENT)
Dept: LAB | Facility: CLINIC | Age: 48
End: 2023-04-28
Payer: COMMERCIAL

## 2023-04-28 DIAGNOSIS — Z51.81 ENCOUNTER FOR THERAPEUTIC DRUG MONITORING: ICD-10-CM

## 2023-04-28 DIAGNOSIS — F64.0 GENDER DYSPHORIA IN ADULT: ICD-10-CM

## 2023-04-28 LAB — ESTRADIOL SERPL-MCNC: 89 PG/ML

## 2023-04-28 PROCEDURE — 36415 COLL VENOUS BLD VENIPUNCTURE: CPT

## 2023-04-28 PROCEDURE — 82670 ASSAY OF TOTAL ESTRADIOL: CPT

## 2023-04-28 PROCEDURE — 84403 ASSAY OF TOTAL TESTOSTERONE: CPT

## 2023-05-03 LAB — TESTOST SERPL-MCNC: 11 NG/DL (ref 8–950)

## 2023-05-04 DIAGNOSIS — F64.0 GENDER DYSPHORIA IN ADULT: Primary | ICD-10-CM

## 2023-07-14 ENCOUNTER — LAB (OUTPATIENT)
Dept: LAB | Facility: CLINIC | Age: 48
End: 2023-07-14
Payer: COMMERCIAL

## 2023-07-14 DIAGNOSIS — F64.0 GENDER DYSPHORIA IN ADULT: ICD-10-CM

## 2023-07-14 LAB — ESTRADIOL SERPL-MCNC: 384 PG/ML

## 2023-07-14 PROCEDURE — 82670 ASSAY OF TOTAL ESTRADIOL: CPT

## 2023-07-14 PROCEDURE — 36415 COLL VENOUS BLD VENIPUNCTURE: CPT

## 2023-09-24 ENCOUNTER — HEALTH MAINTENANCE LETTER (OUTPATIENT)
Age: 48
End: 2023-09-24

## 2023-10-18 DIAGNOSIS — F64.0 GENDER DYSPHORIA IN ADULT: ICD-10-CM

## 2023-10-18 DIAGNOSIS — F64.0 GENDER DYSPHORIA IN ADULT: Primary | ICD-10-CM

## 2023-10-18 RX ORDER — SYRINGE WITH NEEDLE, 1 ML 25GX5/8"
SYRINGE, EMPTY DISPOSABLE MISCELLANEOUS
Qty: 100 EACH | Refills: 1 | Status: SHIPPED | OUTPATIENT
Start: 2023-10-18 | End: 2023-11-02

## 2023-10-18 RX ORDER — ESTRADIOL VALERATE 20 MG/ML
INJECTION INTRAMUSCULAR
Qty: 5 ML | Refills: 1 | Status: SHIPPED | OUTPATIENT
Start: 2023-10-18 | End: 2023-11-02

## 2023-10-18 RX ORDER — NEEDLES, DISPOSABLE 25GX5/8"
NEEDLE, DISPOSABLE MISCELLANEOUS
Qty: 100 EACH | Refills: 1 | Status: SHIPPED | OUTPATIENT
Start: 2023-10-18 | End: 2023-11-02

## 2023-11-02 ENCOUNTER — OFFICE VISIT (OUTPATIENT)
Dept: FAMILY MEDICINE | Facility: CLINIC | Age: 48
End: 2023-11-02
Payer: COMMERCIAL

## 2023-11-02 ENCOUNTER — LAB (OUTPATIENT)
Dept: FAMILY MEDICINE | Facility: CLINIC | Age: 48
End: 2023-11-02

## 2023-11-02 VITALS
WEIGHT: 178.5 LBS | DIASTOLIC BLOOD PRESSURE: 61 MMHG | BODY MASS INDEX: 27.14 KG/M2 | HEART RATE: 67 BPM | OXYGEN SATURATION: 100 % | SYSTOLIC BLOOD PRESSURE: 111 MMHG

## 2023-11-02 DIAGNOSIS — Z12.11 SCREEN FOR COLON CANCER: ICD-10-CM

## 2023-11-02 DIAGNOSIS — F64.9 GENDER INCONGRUENCE: Primary | ICD-10-CM

## 2023-11-02 DIAGNOSIS — Z00.00 PREVENTATIVE HEALTH CARE: ICD-10-CM

## 2023-11-02 LAB — ESTRADIOL SERPL-MCNC: 377 PG/ML

## 2023-11-02 PROCEDURE — 99214 OFFICE O/P EST MOD 30 MIN: CPT | Mod: 25

## 2023-11-02 PROCEDURE — 84403 ASSAY OF TOTAL TESTOSTERONE: CPT | Mod: KX

## 2023-11-02 PROCEDURE — 90686 IIV4 VACC NO PRSV 0.5 ML IM: CPT

## 2023-11-02 PROCEDURE — 91320 SARSCV2 VAC 30MCG TRS-SUC IM: CPT

## 2023-11-02 PROCEDURE — 90471 IMMUNIZATION ADMIN: CPT

## 2023-11-02 PROCEDURE — 82670 ASSAY OF TOTAL ESTRADIOL: CPT | Mod: KX

## 2023-11-02 PROCEDURE — 36415 COLL VENOUS BLD VENIPUNCTURE: CPT

## 2023-11-02 PROCEDURE — 90480 ADMN SARSCOV2 VAC 1/ONLY CMP: CPT

## 2023-11-02 RX ORDER — NEEDLES, DISPOSABLE 25GX5/8"
NEEDLE, DISPOSABLE MISCELLANEOUS
Qty: 100 EACH | Refills: 1 | Status: SHIPPED | OUTPATIENT
Start: 2023-11-02 | End: 2024-08-06

## 2023-11-02 RX ORDER — ESTRADIOL VALERATE 20 MG/ML
INJECTION INTRAMUSCULAR
Qty: 5 ML | Refills: 3 | Status: SHIPPED | OUTPATIENT
Start: 2023-11-02 | End: 2023-11-21

## 2023-11-02 RX ORDER — PROGESTERONE 200 MG/1
200 CAPSULE ORAL AT BEDTIME
Qty: 90 CAPSULE | Refills: 3 | Status: SHIPPED | OUTPATIENT
Start: 2023-11-02

## 2023-11-02 RX ORDER — SYRINGE WITH NEEDLE, 1 ML 25GX5/8"
SYRINGE, EMPTY DISPOSABLE MISCELLANEOUS
Qty: 100 EACH | Refills: 1 | Status: SHIPPED | OUTPATIENT
Start: 2023-11-02 | End: 2024-08-06

## 2023-11-02 NOTE — PATIENT INSTRUCTIONS
"Patient Education   Here is the plan from today's visit    1. Screen for colon cancer  A kit should be mailed to your house with instructions for collecting the stool sample.   - ESTIVEN(EXACT SCIENCES); Future    2. Gender incongruence  I will get back to you after your labs come in. If you have any issues (particularly related to mood) with the progesterone, you can stop taking it. You should get a call in the next few days from the gender clinic with more information about how to get you set up for an orchiectomy. It was great to meet you!    - progesterone (PROMETRIUM) 200 MG capsule; Take 1 capsule (200 mg) by mouth at bedtime  Dispense: 90 capsule; Refill: 3  - syringe/needle, disp, (B-D LUER-JEFFREY SYRINGE) 25G X 1\" 3 ML MISC; USE FOR WEEKLY ESTROGEN INJECTIONS.  Dispense: 100 each; Refill: 1  - estradiol valerate (DELESTROGEN) 20 MG/ML injection; INJECT 0.25 ML INTO THE MUSCLE ONCE A WEEK  Dispense: 5 mL; Refill: 3  - BD HYPODERMIC NEEDLE 18G X 1\" MISC; INJECT 1 SYRINGE INTO MUSCLE ONCE A WEEK  Dispense: 100 each; Refill: 1  - Comprehensive Gender Care Referral - Internal; Future  - Estradiol; Future  - Testosterone total; Future  - syringe, disposable, (IKOTECH SYRINGE LUER LOCK) 1 ML MISC; Use for weekly estrogen injections  Dispense: 100 each; Refill: 3  - Estradiol  - Testosterone total    3. Preventative health care  Covid and flu shots today; wonderful!  - COVID-19 12+ (2023-24) (PFIZER)  - INFLUENZA VACCINE >6 MONTHS (AFLURIA/FLUZONE)      Follow up plan  Return in about 6 months (around 5/2/2024) for Follow up HRT.    Thank you for coming to [facility] today.  Lab Testing:  **If you had lab testing today and your results are reassuring or normal they will be mailed to you or sent through [employer] within 7 days.   **If the lab tests need quick action we will call you with the results.  **If you are having labs done on a different day, please call [phone] to schedule at [facility] or " [phone] for other [facility] locations. Labs do not offer walk-in appointments.  The phone number we will call with results is # [phone] (home) . If this is not the best number please call our clinic and change the number.  Medication Refills:  If you need any refills please call your pharmacy and they will contact us.   If you need to  your refill at a new pharmacy, please contact the new pharmacy directly. The new pharmacy will help you get your medications transferred faster.   Scheduling:  If you have any concerns about today's visit or wish to schedule another appointment please call our office during normal business hours [phone] (8-5:00 M-F). If you can no longer make a scheduled visit, please cancel via [facility] or call us to cancel.   If a referral was made to an [facility] specialty provider and you do not get a call from central scheduling, please refer to directions on your visit summary or call our office during normal business hours for assistance.   If a Mammogram was ordered for you at the [facility] Center call [phone] to schedule or change your appointment.  If you had an XRay/CT/Ultrasound/MRI ordered the number is [phone] to schedule or change your radiology appointment.   [facility] has limited ultrasound appointments available on Wednesdays, if you would like your ultrasound at [facility], please call [phone] to schedule.   Medical Concerns:  If you have urgent medical concerns please call [phone] at any time of the day.    LUIS ANGEL Holder MD

## 2023-11-02 NOTE — PROGRESS NOTES
"  Assessment & Plan     Gender incongruence  Doing well, has now been on HRT for 15 months. In July we had decreased estradiol from 0.3mL weekly (6mg) to 0.25mL (5mg) due to supra-physiologic estradiol level. Today is approximately mid-cycle, will re-check labs today. Starting progesterone today per Jessica's preference; she will monitor for response.   - progesterone (PROMETRIUM) 200 MG capsule; Take 1 capsule (200 mg) by mouth at bedtime  - syringe/needle, disp, (B-D LUER-JEFFREY SYRINGE) 25G X 1\" 3 ML MISC; USE FOR WEEKLY ESTROGEN INJECTIONS.  - estradiol valerate (DELESTROGEN) 20 MG/ML injection; INJECT 0.25 ML INTO THE MUSCLE ONCE A WEEK  - BD HYPODERMIC NEEDLE 18G X 1\" MISC; INJECT 1 SYRINGE INTO MUSCLE ONCE A WEEK  - Comprehensive Gender Care Referral - Internal; Future  - syringe, disposable, (Handpressions SYRINGE LUER LOCK) 1 ML MISC; Use for weekly estrogen injections  - Estradiol  - Testosterone total    [facility] health care  - COVID-19 12+ (2023-24) (PFIZER)  - COLOGUARD(EXACT SCIENCES); Future  - INFLUENZA VACCINE >6 MONTHS (AFLURIA/FLUZONE)    Screen for colon cancer  - COLOGUARD(EXACT SCIENCES); Future    Review of the result(s) of each unique test - Previous estradiol, testosterone levels    Return in about 6 months (around 5/2/2024) for Follow up HRT.    LUIS ANGEL Holder MD  [facility] RAYSA Lopez is a 48 year old, presenting for the following health issues:  Follow Up (Pt wants to further discuss treatment plan options)        11/2/2023     8:39 AM   Additional Questions   Roomed by Mason HUIZAR     First time meeting, was a patient of Dr. Phelps. Started HRT July 2022 with an online provider, transitioned to [facility] as primary care and HRT provider later that summer.     Thinking about progesterone.     Would like a referral for orchiectomy.     Taking 0.25mL (5mg). She takes it on Sundays.     Not much of a change since visit in February. She has noticed some " weight gain. She likes walking, hiking, cycling when it's not winter. Not many new changes in terms of gender transition. She feels like things are evening out.    Talked about her cat, who sometimes tags along on bike rides in a small trailer! So cute!    Reviewed emergency contact - if Jessica were to be unable to participate in decision-making she would want her mother, Donita to be her surrogate decision maker.         Objective    /61   Pulse 67   Wt 81 kg (178 lb 8 oz)   SpO2 100%   BMI 27.14 kg/m    Body mass index is 27.14 kg/m .  Physical Exam  Constitutional:       General: She is not in acute distress.     Appearance: Normal appearance. She is not ill-appearing.   Cardiovascular:      Rate and Rhythm: Normal rate.   Pulmonary:      Effort: Pulmonary effort is normal.   Neurological:      General: No focal deficit present.      Mental Status: She is alert and oriented to person, place, and time.   Psychiatric:         Mood and Affect: Mood normal.         Behavior: Behavior normal.         Thought Content: Thought content normal.         Judgment: Judgment normal.

## 2023-11-02 NOTE — PROGRESS NOTES
Preceptor Attestation:   Patient seen, evaluated and discussed with the resident. I have verified the content of the note, which accurately reflects my assessment of the patient and the plan of care.   Supervising Physician:  Jarred Pruitt MD

## 2023-11-05 LAB — TESTOST SERPL-MCNC: 14 NG/DL (ref 8–950)

## 2023-11-08 ENCOUNTER — TELEPHONE (OUTPATIENT)
Dept: PLASTIC SURGERY | Facility: CLINIC | Age: 48
End: 2023-11-08
Payer: COMMERCIAL

## 2023-11-08 NOTE — CONFIDENTIAL NOTE
Writer discussed referral for orchiectomy with pt. Pt needs to establish with  providers. Writer sent provider list via [employer]. Pt to call back when she has two providers working on LOS.

## 2023-12-20 ENCOUNTER — TELEPHONE (OUTPATIENT)
Dept: FAMILY MEDICINE | Facility: CLINIC | Age: 48
End: 2023-12-20
Payer: COMMERCIAL

## 2023-12-20 NOTE — TELEPHONE ENCOUNTER
[facility] phone call message- general phone call:    Reason for call: The patient is requesting a call back for injection training.    Return call needed: Yes    OK to leave a message on voice mail? Yes    Primary language: English      needed? No    Call taken on December 20, 2023 at 11:03 AM by Abbey Steward

## 2023-12-20 NOTE — TELEPHONE ENCOUNTER
Scheduled patient for appointment. They are not happy to come in but labs are off. Explained the importance of checking to make sure they are administering correctly. Patient understands and will come in if they have to. They will bring their supplies with them.   Jonelle VARGAS RN

## 2024-01-02 ENCOUNTER — ALLIED HEALTH/NURSE VISIT (OUTPATIENT)
Dept: FAMILY MEDICINE | Facility: CLINIC | Age: 49
End: 2024-01-02
Payer: COMMERCIAL

## 2024-01-02 DIAGNOSIS — F64.9 GENDER INCONGRUENCE: Primary | ICD-10-CM

## 2024-01-02 PROCEDURE — 99207 PR NO CHARGE NURSE ONLY: CPT

## 2024-01-02 NOTE — PROGRESS NOTES
Patient has been doing injections for a very long time. They are concerned about how they are doing the injections because their estrogen levels have been high for the last two blood draws.    Patient has 3ml syringes that they have been using, very difficult to measure 0.2ml with that large of a syringe. Patient does have some of the 1ml syringes and I recommended that they use these.    Demonstrated how to draw up and patient expressed understanding. Patient asked about follow up labs which are in the chart. Scheduled for 1/4/24 for lab only appointment. Will check with provider if this is too soon or if ok.    Patient talking about needing to find therapist but is feeling like this is a step backwards. I encouraged patient to follow through with therapy as we change over time and have different needs.    Patient expressed understanding    Chikis Rm RN

## 2024-01-04 ENCOUNTER — LAB (OUTPATIENT)
Dept: LAB | Facility: CLINIC | Age: 49
End: 2024-01-04
Payer: COMMERCIAL

## 2024-01-04 DIAGNOSIS — F64.9 GENDER INCONGRUENCE: ICD-10-CM

## 2024-01-04 LAB — ESTRADIOL SERPL-MCNC: 271 PG/ML

## 2024-01-04 PROCEDURE — 82670 ASSAY OF TOTAL ESTRADIOL: CPT

## 2024-01-04 PROCEDURE — 36415 COLL VENOUS BLD VENIPUNCTURE: CPT

## 2024-01-04 PROCEDURE — 84403 ASSAY OF TOTAL TESTOSTERONE: CPT

## 2024-01-05 LAB — TESTOST SERPL-MCNC: 11 NG/DL (ref 8–950)

## 2024-05-29 ENCOUNTER — TELEPHONE (OUTPATIENT)
Dept: FAMILY MEDICINE | Facility: CLINIC | Age: 49
End: 2024-05-29
Payer: COMMERCIAL

## 2024-05-29 NOTE — TELEPHONE ENCOUNTER
RN called and spoke to pt and scheduled pt for 6/12 with PCP. RN attempted to get pt scheduled sooner with another provider but pt declined at this time. Pt reports that they are safe and stable. RN advised pt to call back if they would like to be seen sooner. Pt verbalized understanding.      Eleanor Moran RN

## 2024-05-29 NOTE — TELEPHONE ENCOUNTER
[facility] phone call message- general phone call:    Reason for call: Patient wants to discuss anxiety with  but she does not have any openings until 07/01 and patient wants to know if she can see her sooner than that.    Return call needed: Yes    OK to leave a message on voice mail? Yes    Primary language: English      needed? No    Call taken on May 29, 2024 at 12:16 PM by Gem Coleman

## 2024-06-12 ENCOUNTER — OFFICE VISIT (OUTPATIENT)
Dept: FAMILY MEDICINE | Facility: CLINIC | Age: 49
End: 2024-06-12
Payer: COMMERCIAL

## 2024-06-12 VITALS
WEIGHT: 174 LBS | TEMPERATURE: 98.3 F | HEART RATE: 70 BPM | BODY MASS INDEX: 26.37 KG/M2 | RESPIRATION RATE: 16 BRPM | SYSTOLIC BLOOD PRESSURE: 128 MMHG | DIASTOLIC BLOOD PRESSURE: 78 MMHG | OXYGEN SATURATION: 98 % | HEIGHT: 68 IN

## 2024-06-12 DIAGNOSIS — F41.9 ANXIETY: ICD-10-CM

## 2024-06-12 DIAGNOSIS — F64.9 GENDER INCONGRUENCE: Primary | ICD-10-CM

## 2024-06-12 LAB
ESTRADIOL SERPL-MCNC: 399 PG/ML
FASTING STATUS PATIENT QL REPORTED: NO
GLUCOSE SERPL-MCNC: 65 MG/DL (ref 70–99)
HGB BLD-MCNC: 13.6 G/DL (ref 11.7–17.7)

## 2024-06-12 PROCEDURE — 36415 COLL VENOUS BLD VENIPUNCTURE: CPT

## 2024-06-12 PROCEDURE — 84403 ASSAY OF TOTAL TESTOSTERONE: CPT

## 2024-06-12 PROCEDURE — 80061 LIPID PANEL: CPT

## 2024-06-12 PROCEDURE — 85018 HEMOGLOBIN: CPT

## 2024-06-12 PROCEDURE — G2211 COMPLEX E/M VISIT ADD ON: HCPCS

## 2024-06-12 PROCEDURE — 82670 ASSAY OF TOTAL ESTRADIOL: CPT

## 2024-06-12 PROCEDURE — 99214 OFFICE O/P EST MOD 30 MIN: CPT | Mod: GC

## 2024-06-12 PROCEDURE — 82947 ASSAY GLUCOSE BLOOD QUANT: CPT

## 2024-06-12 ASSESSMENT — ANXIETY QUESTIONNAIRES
1. FEELING NERVOUS, ANXIOUS, OR ON EDGE: SEVERAL DAYS
GAD7 TOTAL SCORE: 5
7. FEELING AFRAID AS IF SOMETHING AWFUL MIGHT HAPPEN: SEVERAL DAYS
6. BECOMING EASILY ANNOYED OR IRRITABLE: SEVERAL DAYS
GAD7 TOTAL SCORE: 5
IF YOU CHECKED OFF ANY PROBLEMS ON THIS QUESTIONNAIRE, HOW DIFFICULT HAVE THESE PROBLEMS MADE IT FOR YOU TO DO YOUR WORK, TAKE CARE OF THINGS AT HOME, OR GET ALONG WITH OTHER PEOPLE: SOMEWHAT DIFFICULT
5. BEING SO RESTLESS THAT IT IS HARD TO SIT STILL: NOT AT ALL
3. WORRYING TOO MUCH ABOUT DIFFERENT THINGS: SEVERAL DAYS
2. NOT BEING ABLE TO STOP OR CONTROL WORRYING: SEVERAL DAYS
GAD7 TOTAL SCORE: 5
7. FEELING AFRAID AS IF SOMETHING AWFUL MIGHT HAPPEN: SEVERAL DAYS
4. TROUBLE RELAXING: NOT AT ALL
8. IF YOU CHECKED OFF ANY PROBLEMS, HOW DIFFICULT HAVE THESE MADE IT FOR YOU TO DO YOUR WORK, TAKE CARE OF THINGS AT HOME, OR GET ALONG WITH OTHER PEOPLE?: SOMEWHAT DIFFICULT

## 2024-06-12 ASSESSMENT — PATIENT HEALTH QUESTIONNAIRE - PHQ9
10. IF YOU CHECKED OFF ANY PROBLEMS, HOW DIFFICULT HAVE THESE PROBLEMS MADE IT FOR YOU TO DO YOUR WORK, TAKE CARE OF THINGS AT HOME, OR GET ALONG WITH OTHER PEOPLE: SOMEWHAT DIFFICULT
SUM OF ALL RESPONSES TO PHQ QUESTIONS 1-9: 4
SUM OF ALL RESPONSES TO PHQ QUESTIONS 1-9: 4

## 2024-06-12 NOTE — PATIENT INSTRUCTIONS
Patient Education   Here is the plan from today's visit    1. Gender incongruence  Let's loop back around in 2-3 weeks to keep chatting. Try taking the progesterone by mouth and at night. Taking it rectally as you have been can cause rapid fluctuations in the hormone levels, which may be contributing to your anxiety and mood swings.    - Testosterone total  - Estradiol  - Hemoglobin  - Lipid panel  - Glucose    2. Anxiety  Will be interested to hear what changes you notice with taking the progesterone by mouth and at night. It's possible this change will give you a big improvement in mood! If not, we have this referral in place for you to meet with a psychiatry provider to discuss medication options.   - Adult Mental Health  Referral; Future    Follow up plan  Return in about 2 weeks (around 6/26/2024).    Thank you for coming to [facility] today.  Lab Testing:  **If you had lab testing today and your results are reassuring or normal they will be mailed to you or sent through [facility] within 7 days.   **If the lab tests need quick action we will call you with the results.  **If you are having labs done on a different day, please call [phone] to schedule at [facility] or [phone] for other [facility] locations. Labs do not offer walk-in appointments.  The phone number we will call with results is # [phone] (home) . If this is not the best number please call our clinic and change the number.  Medication Refills:  If you need any refills please call your pharmacy and they will contact us.   If you need to  your refill at a new pharmacy, please contact the new pharmacy directly. The new pharmacy will help you get your medications transferred faster.   Scheduling:  If you have any concerns about today's visit or wish to schedule another appointment please call our office during normal business hours [phone] (8-5:00 M-F). If you can no longer make a scheduled  visit, please cancel via Skyfiber or call us to cancel.   If a referral was made to an [facility] specialty provider and you do not get a call from central scheduling, please refer to directions on your visit summary or call our office during normal business hours for assistance.   If a Mammogram was ordered for you at the [facility] Center call [phone] to schedule or change your appointment.  If you had an XRay/CT/Ultrasound/MRI ordered the number is [phone] to schedule or change your radiology appointment.   [facility] has limited ultrasound appointments available on Wednesdays, if you would like your ultrasound at [facility], please call [phone] to schedule.   Medical Concerns:  If you have urgent medical concerns please call [phone] at any time of the day.    LUIS ANGEL Holder MD     Dementia caregivers resources:  [email or web address]    Complete guide to changing name and/or gender marker on social security card, 's license, passport, and birth certificate     Recommended order for changing name and/or gender marker documents  Obtain order granting name change  Social security card    s license   Passport   Birth certificate     Overview  Obtain order granting name change  Step 1: Submit 3 pieces of paperwork to the county you live in to request a meeting with a : application for name change, release for for criminal history background check, and proposed order granting name change  Step 2 : Meet with a  with 2 witnesses  Step 3: Order a  Certified  copy of your  Order Granting Name Change   Step 4: After you receive your Certified  Order Granting Name Change  in the mail, use it to update your other documents.   Fees: $14 per copy of order granting name change  Social security card   Step 1: Obtain a certified court order for name change  Step 2: Gather your proof of  identity ('s license, ID card, or passport) and proof of U.S. citizenship (passport, birth certificate, or certificate of naturalization)  Step 3: Print and complete the application form  Step 4: Submit in person or mail the application form, certified court order, and ID documents  Fees: none   s license   You don t need to submit any special documents to change your gender marker on your  s license. All you need to do is select your gender when you fill out your new application for a 's license. You do need to go through a special process to change your name, however.   Step 1: Obtain a certified court order for name change  Step 2: Update the name on your Social Security card  Step 3: Gather your current state ID card or 's license  Step 4: Visit your local [place] DVS office in person within 30 days of updating your Social Security card. Bring your certified court order, current ID, and fee.  Fee: $50 (as of Nov 2019)  Passport   Step 1: Obtain a certified court order for name change  Step 2: Obtain original, signed letter from your Physician confirming your gender transition  Step 3: Fill out DS-11, the application for a [place] Passport  Step 4: Gather proof of U.S. citizenship and make photocopy (any of the following)  Previous U.S[place] Passport  Certified original birth certificate (the one you got when you were born)  Certificate of Naturalization  Report of Birth Abroad  Step 5: Gather a photocopy of an ID that looks like you (any of the following)   s License    Identification/ Identification Card  Passport, Certificate of Naturalization, or Report of Birth Abroad if these are recent  Step 6: Obtain a recent color photograph 2x2 inches in size  Step 7: Take your certified court order, letter from physician, application, proof of U.S. citizenship, photo ID, 2x2 color photograph to any Passport Acceptance Facility.   Fee: $110 (as of November  2019)  Birth certificate   Step 1: Obtain a certified court order for name change  Step 2: Complete the Birth Record Amendment Application  Step 3: Sign Birth Record Amendment Application in front of a notary (bring a form of government photo ID like a  s license or passport, not a student ID.)  Step 4: Mail the completed and notarized birth record amendment application, official certified court order for name change, and a check to [place] [facility] Records [address] [address], [facility] [address]  Fees: $66 (as of Nov 2019)    Start here: How to Obtain a  Certified Order Granting Name Change  in [place]  Getting a Certified Court Order for Name Change is the first step to changing your name on all your legal documents. You'll need the court order to change your social security card,  s license, passport, and birth certificate. For every legal document you want to change, you need to submit a Certified copy of the court order. It can't be a photocopy. When you send in the certified copy with your application to change your social security card, birth certificate, or passport, the certified copy will be returned to you by mail.    If you happen to have a felony or bankruptcy on your record, it s best to do the paperwork with an .     This is not meant as legal advice - it s a  guide to help you along the process. If you have further questions or want help filling out the paperwork, ask your primary care clinic if you can meet with a  or care coordinator.      Overview  Step 1: Submit 3 pieces of paperwork to the county you live in to request a meeting with a : application for name change, release for for criminal history background check, and proposed order granting name change  Step 2 : Meet with a   Step 3: Order a  Certified  copy of your  Order Granting Name Change   Step 4: After you receive your Certified  Order Granting Name  "Change  in the mail, keep it in a safe location so you can use it to update your other documents. If they are stapled, do not remove the staple. It will make the documents invalid.     Step 1: Submit 3 pieces of paperwork to the county you live in  Officially known as:  Requesting a Hearing for a Court Order Granting Name Change     Download and fill out these three forms. To access the forms, go to: [email or web address]   Application for name change  (PTD546)   Release form for a criminal history background check\" (GSB297)   Proposed order granting name change\" (YQV709) this is a form that the  will use as an example when making your legal name change documentation, which shows how you want your name spelled.     Either mail or drop the forms off at your [facility] courthouse. The addresses for [facility] are listed below.     After the office receives your forms, you will get a letter in the mail that will have your next steps. It may list your meeting date ( court date/hearing ), or a phone number for you to call and schedule when works best for you.     [facility]  Attn: Civil Filing  C300  [address], [address]    [facility] [address]  Civil division, Room 170  [address] [place], [address]    Tips:  Some people choose to change their legal name, but not gender marker (and vice versa). You can indicate what you want to change on the forms mentioned above.   Unfortunately, there isn t a non-binary gender marker on any IDs besides [place]  s licenses. On this paperwork, you can only check  male  or  female .       Step 2 : Meet with a   On your meeting date, bring along two people you know that can confirm that you are who you say you are. They must have known you for at least one year, and be 18 years old or older. They can be coworkers, friends, classmates, family, etc. If you are , you have to bring your " spouse as one of your witnesses.     When the  approves your application, they will sign a document called  Order Granting Name Change   You ll get an unofficial copy of this document right away, but you ll have to submit another request to the Records Department to get an official  Certified  copy of the court order. These have an official stamp from the county office, and can t be photocopied.   You ll have to pay a fee of $14 for each official copy     The meeting usually lasts only a few minutes. The meeting room typically won t look like a traditional  courtroom . They re usually small conference rooms or offices. When you arrive, check in with a person at the desk and then wait for your name to be called. Make sure to bring a current form of ID, like a  s license or passport (The ID needs to have your face, name, and date of birth).     After the hearing, your legal name will officially be changed. Congratulations! You will need to obtain a certified copy of the court order to update your legal documents such as social security card, passport, 's license, and birth certificate.       Step 3: Order a Certified copy of your  Court Order Granting Name Change    This process is different depending on the [facility]. We ve included the instructions for [place]. If your county isn t listed on this form, talk to a person at your local courthouse when you go to your court date.     Consider ordering 2 copies, as you ll need the official certified document to update your social security card,  s license, passport, and birth certificate. The offices for these various ID changes will send the certified copy back to you when they re done processing your forms, but it may take awhile - so it s good to have an extra. Do not remove the staple from the official court order - it will make the document invalid.      [facility]  Download and print this document: bit.ly/copy_request_form  Mail  completed Request Form, photocopy of your  s license or other official ID, and payment in the form of a check to:  [facility] Records Center [facility]   [address], [address]   [place] [address]  They ll send you an official certified copy of the Certified  Court Order Granting Name Change  via mail to the address on your form.    Tips:  Under  Requesting Party , put your new legal name and date of birth  Under  Case Information , write your old legal name on the line for  [facility]  After the court hearing, you need to call or mail a request for your Certified  Court Order Granting Name Change   After receiving your request and payment, they will mail you a certified copy of the Order Granting Name Change  To order an official certified copy over the phone:   Call the Records Department at [phone]  You will need your court case file number* and a debit/credit card to pay the fee of $14 per certified copy  *To find your court case file number:   Go to: [email or web address].   Click on   [facility] Case Search   Accept the terms and conditions  Select  Judgements Search   Enter your name, click search, and it should show your case number    To order an official certified copy via mail:  Write a note saying  I d like to request ___ copies of my certified Order Granting Name change. My file number is ____ and my phone number is _____.    Along with the note, include your payment of $14 per certified copy in a check made out to  [facility] JudCounts include 234 beds at the [facility] .   Mail to:  [address] [facility] [address] [facility]  [address]  [place], [address]    Step 4: After you receive your Certified  Order Granting Name Change  in the mail, keep it in a safe location so you can use it to update your other documents  You'll need a Certified  Order Granting Name Change  to change your 's license, birth  certificate, social security card, and passport.  For every legal document you want to change, you need to submit a Certified copy of the court order - it can't be a photocopy.     When you send in the certified copy with your application to change your social security card, birth certificate, or passport, the certified copy will be returned to you by mail.    Frequently Asked Questions  Will the  turn down my request to change my name?  The court will only turn down a request if they think you are trying to mislead someone (obviously, this not the case). This is why they ask you to bring along 2 witnesses.    What can I expect on my court day?  The hearings usually last a few minutes. When you arrive, check in with a person at the desk and then wait for your name to be called. The hearing rooms generally look like conference rooms, not a big courtroom.    What if I can't afford the fees?  Talk with a person at the courthouse when you go in for your court hearing. They are friendly and will be able to help you apply for financial assistance.       Changing your Name and/or Gender Marker on your Social Security Card  Overview  Step 1: Obtain a certified court order for name change  Step 2: Gather your proof of identity ('s license, ID card, or passport) and proof of U.S. citizenship (passport, birth certificate)  Step 3: Complete the application form  Step 4: Submit in person or mail the application form, certified court order, and ID documents    Gather your Documents  All your documents need to be original (not photocopies or notarized copies) and current (not ). This includes all IDs, certificates, etc. All your documents will be mailed back to you. The documents can have your old legal name on them.    1. Proof of legal name change   Certified court order for name change  To prove your identity (any of the following)  [place]  s license  State-issued non- s identification card  [place]  passport  Proof of U.[place]. citizenship (any of the following)  [place] birth certificate  U.[place]. passport (unexpired)  Certificate of Naturalization  Certificate of Citizenship    For minors: You must also include proof of your parent's identity. This could be in the form of their current (unexpired), original [place] 's license, State-issued non- identification card, or U.S. passport.    Fill out Application Form  Find form at: [email or web address]  The form begins on page 5  Complete in black or blue ink  If you are under the age of 18, you may sign the form yourself or have your parent/legal guardian sign for you.        Submit in-person or mail your official proof of name change, identification, proof of [place] citizenship, and application   Address  [facility]  [address]  [place] [address]    Hours  Monday 9:00 AM - 4:00 PM  Tuesday 9:00 AM - 4:00 PM  Wednesday 9:00 AM - 4:00 PM  Thursday 9:00 AM - 4:00 PM  Friday 9:00 AM - 4:00 PM  Saturday Closed  Sunday Closed    Directions  Located 5 blocks south of the [employer]  [address] Bus stops at the corner of [address] [place] [address] in both North and South directions  The card center entrance is located on the east side of the building    Frequently Asked Questions  How much does it cost to change my social security information?  Changing the information on your social security card is free.    Can my identifying documents have my old name on them?  Your identifying documents can have your old legal name on them. In other words, you don't need a new birth certificate, passport, or 's license to change your name on your social security card.     Can I change my name online?  Unfortunately, you have to appear in person or mail in your application.    I am a foreign-born [place] Citizen. Is this process the same for me?  The only difference is, if you don't have a current passport, you can send any of these documents to prove  citizenship:   Certificate of Naturalization (N-550/N-570)  Certificate of Citizenship (N-560/N-561)  Certificate of Report of Birth (DS-1350)  Consular Report of Birth Abroad (FS-240, CRBA)      Changing your Name and/or Gender Marker on your 's License  You don t need to submit any special documents to change your gender marker on your  s license. Sex is  self-designated  in [place], meaning you don t need a note from a doctor or a court order. All you need to do is select your gender when you fill out your new application for a  s License.     Before you can change the name on your  s license, you need to   (1) Obtain a  Certified Court order for Name Change   (2) Update the name on your Social Security card     After you get your Certified Court Order for Name Change and update your Social Security card, it s best to change your name at the DMV as soon as possible. The exact time frame changes, but you may want to try and update your [place]  s license within 30 days of receiving your Certified Court Order in the mail.    To change your name, visit your local [place] DVS office in person. You cannot change your name online or by mail. To replace your identification card or 's license bring:  Your legal name change document (your official certified court order)   Your current state ID card or 's license.  Payment: Fee is around $50 as of October 2019    Changing your Name and/or Gender Marker on your Passport    The following information is taken from the [facility] [place] website, a resource that can be found here: https://transequality.org/know-your-rights/passports    Updating Your Gender Marker on an Existing Passport or Getting a Passport for the First Time  You must apply in person at a  Passport Acceptance Facility   Summary of what you ll need to submit   Certified Order for Name Change  Original, signed letter from your Physician confirming  your gender transition  DS-11, the application for a [place]  Any of the following (include the original and a photocopy - they ll return the original to you and keep the photocopy)  Previous [place] Passport  Certified original birth certificate (the one you got when you were born)  Certificate of Naturalization  Report of Birth Abroad  A photocopy of an ID that looks like you (any of the following)   s License    Identification/ Identification Card  Passport, Certificate of Naturalization, or Report of Birth Abroad if these are recent  Recent color photograph 2x2 inches in size  Fee ($110 as of November 2019)    Detailed Information  You will need to submit:     If you are changing your name on your passport, a  Certified Order for Name Change  (certified copy showing an official seal and  s signature, not a photocopy).   Please see our guide on  getting a certified court order for name change  for instructions on how to get this document    An original, signed letter from your Physician confirming your gender transition  It s very important that your doctor follow the template exactly, and print the letter on official clinic letterhead. Have your doctor sign the form in blue ink, so they know it s an original and not a photocopy.  You don t have to provide any details about your transition. In fact, you don t have to be using any medical gender affirmation treatments at all (hormones, surgeries). The definition of  gender transition  is completely up to you and your doctor.   For the template, see the last page of this guide or go to [email or web address].org/know-your-[email or web address] and look under  what should the physician letter include . If your physician is at [facility], they have access to a pre-made template.    Application for [place] (Form DS-11); http://bit.ly/37tvUMl   Print and fill out in black ink  Write your name and sex exactly as you would like it  to appear on your new passport  Do not sign until in front of a passport officer         Proof of U.S. Citizenship (any of the following)  Previous U[place] Passport  Certified original birth certificate + photocopy of the front and back  This needs to be the birth certificate you got when you were born - not one issued after a name change  Certificate of Naturalization  Report of Birth Abroad    Proof of Identity that contains your signature and photograph that is  a good likeness to you . You must present the original AND provide a photocopy of the front and back side with your application. If your current appearance looks different than your  ID photo, you ll want to use something more current. You can use any of the following:  Previous U.[place] Passport   s License   Certificate of Naturalization   Identification, or a  Identification Card    A recent color photograph 2x2 inches in size  Most Passport Acceptance Facilities can take your photograph. Otherwise, you can have it done at [email or web address] or [employer].    Fee (See Department of State Passport Fees for cost).   $110, as of November 2019  Make check out to:  Dept of State  [place]: Full name and date of birth (as it appears on your application)     Take these documents and fees in person to any Passport Acceptance Facility.   To find the acceptance facility closest to you, visit the State Department s website, Passport Acceptance Facility Search Page, at http://iafdb.travel.state.gov or call the National Passport Information Center at 1-333.328.5768.      These facilities are in [place] and can take your photo on-site and are handicap accessible    [place] MAIN POST OFFICE  [address]  [place], [place] [address] [phone]    [facility] INTERNATIONAL [place]   [address]   [place], [place], [address]   [phone]    [facility]   [address]   [facility],  [address]   [phone]      How to change your Legal Name and/or Gender Marker on your Birth Certificate    You don t need an updated birth certificate to get a new passport,  s license, or social security card. You can choose to change your legal name, gender marker, or both.    Step 1: Get official documentation of your name change and/or gender transition     The required documentation depends on if you want to change your (a) legal name and gender marker (b) legal name only, or (c) legal gender marker only    If you want to change your name and gender marker, or legal name only  You need a  Certified Court Order  that specifies you want to change your legal name and gender marker on your birth certificate  The court order needs to be an official  certified  copy - you can t submit a photocopy.    If you want to change your legal gender marker only  If you re not trying to change your name, you can submit either a Certified Court Order or an original, signed letter from your doctor confirming your gender transition  If you decide to get a physician letter rather than a court order, it s very important that your doctor follow the template exactly, and print the letter on the official clinic letterhead.  The medical documentation doesn t have to include any details about your transition. In fact, you don t have to be using any medical treatments at all (hormones, surgeries). The definition of  gender transition  is completely up to you and your doctor.   For the template, see the last page of this guide or go to [email or web address] and look under  what should the physician letter include .    Step 2: Submit a Birth Record Amendment Application   You can work on this while you wait for your court date, but you ll need the official  Order Granting Name Change  before you can send it in.    Download and print the application (the application is on page 7 of the PDF):  [email or web address]  Don t sign it yet! You ll need to sign it in front of a notary. This is a person who has a license from the government to certify certain documents.   To find a notary, toby  [place] [facility] [place]  - there are lots of places to get documents notarized.   At most places, you don t have to make an appointment, and the fee is around $5.   Bring a form of government photo ID like a  s license or passport (not a student ID)  Mail the completed and notarized Birth Record Amendment Application along with the Official Certified Order Granting Name Change and a check to:  [facility]  Vital Records [address] [address]   [facility] [id number]      Tips  Make sure to request at least one copy of the new birth certificate, as you ll need this to change your  s license, etc. This will cost an extra $26 in addition to the $40 fee for changing your legal name and/or gender marker.   They will return your official certified copy of the Order Granting Name Change, so you can use it to change your name and/or legal gender marker on your passport,  s license, and social security card.     Frequently Asked Questions  I don t identify as female or male. Is there a nonbinary option for the gender marker on [place] birth certificates?  Unfortunately, as of January 2, 2020, there isn t a nonbinary or  X  option for the gender marker on [place] birth certificates. There is, however, a nonbinary option on [place]  s licenses.     How much will it cost to change my legal gender marker and/or name on my birth certificate? Answer as of Nov 2019    Filing fee for an official copy of your court order (2 official copies, $14 each)  $24   Notary fee  $5   The fee for administrative review and processing of a birth certificate amendment request  $40   One copy of the new birth certificate  $26   Total  $95       How long will I have to wait after submitting my birth  record amendment application?  It may take 1-3 months to process and mail your new birth certificate. To check, go to: [email or web address].    How do I get a Fee Waiver?  You may qualify to file for a waiver if your income is at or below 125% of the Federal poverty level; if you receive public assistance; OR if you can show that you do not have enough money to pay the filing fee. Please see our separate guide on fee waivers or speak with a person at your local courthouse.     If you have further questions, or want help filling out the paperwork, ask your primary care clinic to connect you with their  or care coordinator. You can also contact the [place] office of vital records at [email or web address] or [phone].      Example template for letter from physician    Letter Certifying Applicant s Gender Change - Printed on physician s letterhead    I, __________________________________________________________________________,  (Physician s Full Name)    ____________________________________,       ____________________________________,  (Physician s medical license/certificate number)     (Issuing State/Country of license/certificate)    am the physician of _________________________________________________________,  (Name of Patient)  _________________________________________.   (Date of Birth of Patient)    with whom I have a doctor/patient relationship and whom I have treated, or with whom I have a doctor/patient relationship and whose medical history I have reviewed and evaluated.    ______________________________________________________________________,   (Name of Patient)    has had appropriate clinical treatment for transition to    [] male [] female.  I declare under penalty of perjury under the laws of the [place] that the foregoing is true and correct.    ____________________________________  Signature of  Physician       ____________________________________  Physician s Address    ____________________________________  Typed Name of Physician       ____________________________________  Date  ____________________________________    Physician s Phone Number

## 2024-06-13 LAB
CHOLEST SERPL-MCNC: 169 MG/DL
FASTING STATUS PATIENT QL REPORTED: NO
HDLC SERPL-MCNC: 50 MG/DL
LDLC SERPL CALC-MCNC: 102 MG/DL
NONHDLC SERPL-MCNC: 119 MG/DL
TRIGL SERPL-MCNC: 84 MG/DL

## 2024-06-15 LAB — TESTOST SERPL-MCNC: 11 NG/DL (ref 8–950)

## 2024-06-20 ASSESSMENT — PATIENT HEALTH QUESTIONNAIRE - PHQ9
SUM OF ALL RESPONSES TO PHQ QUESTIONS 1-9: 4
SUM OF ALL RESPONSES TO PHQ QUESTIONS 1-9: 4
10. IF YOU CHECKED OFF ANY PROBLEMS, HOW DIFFICULT HAVE THESE PROBLEMS MADE IT FOR YOU TO DO YOUR WORK, TAKE CARE OF THINGS AT HOME, OR GET ALONG WITH OTHER PEOPLE: SOMEWHAT DIFFICULT

## 2024-06-21 ENCOUNTER — VIRTUAL VISIT (OUTPATIENT)
Dept: PSYCHIATRY | Facility: CLINIC | Age: 49
End: 2024-06-21
Attending: STUDENT IN AN ORGANIZED HEALTH CARE EDUCATION/TRAINING PROGRAM
Payer: COMMERCIAL

## 2024-06-21 DIAGNOSIS — F41.9 ANXIETY: ICD-10-CM

## 2024-06-21 DIAGNOSIS — F64.0 GENDER DYSPHORIA IN ADULT: Primary | ICD-10-CM

## 2024-06-21 DIAGNOSIS — F43.10 PTSD (POST-TRAUMATIC STRESS DISORDER): ICD-10-CM

## 2024-06-21 PROCEDURE — 99204 OFFICE O/P NEW MOD 45 MIN: CPT | Mod: 95 | Performed by: STUDENT IN AN ORGANIZED HEALTH CARE EDUCATION/TRAINING PROGRAM

## 2024-06-21 RX ORDER — HYDROXYZINE HYDROCHLORIDE 25 MG/1
25 TABLET, FILM COATED ORAL 3 TIMES DAILY PRN
Qty: 30 TABLET | Refills: 0 | Status: SHIPPED | OUTPATIENT
Start: 2024-06-21 | End: 2024-07-29

## 2024-06-21 RX ORDER — ESCITALOPRAM OXALATE 5 MG/1
5 TABLET ORAL DAILY
Qty: 30 TABLET | Refills: 0 | Status: SHIPPED | OUTPATIENT
Start: 2024-06-21 | End: 2024-07-03

## 2024-06-21 NOTE — NURSING NOTE
Is the patient currently in the state of [place]? YES    Visit mode:VIDEO    If the visit is dropped, the patient can be reconnected by: VIDEO VISIT: Text to cell phone:   Telephone Information:   Mobile [phone]       Will anyone else be joining the visit? NO  (If patient encounters technical issues they should call [phone]    How would you like to obtain your AVS? MyChart    Are changes needed to the allergy or medication list? Pt stated no changes to allergies and Pt stated no med changes    Are refills needed on medications prescribed by this physician?     Reason for visit: Consult    Maryam POST
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
## 2024-07-03 ENCOUNTER — OFFICE VISIT (OUTPATIENT)
Dept: FAMILY MEDICINE | Facility: CLINIC | Age: 49
End: 2024-07-03
Payer: COMMERCIAL

## 2024-07-03 VITALS
HEIGHT: 68 IN | WEIGHT: 175.1 LBS | BODY MASS INDEX: 26.54 KG/M2 | DIASTOLIC BLOOD PRESSURE: 67 MMHG | HEART RATE: 73 BPM | RESPIRATION RATE: 16 BRPM | TEMPERATURE: 98.9 F | OXYGEN SATURATION: 97 % | SYSTOLIC BLOOD PRESSURE: 116 MMHG

## 2024-07-03 DIAGNOSIS — F41.9 ANXIETY: ICD-10-CM

## 2024-07-03 DIAGNOSIS — F64.9 GENDER INCONGRUENCE: Primary | ICD-10-CM

## 2024-07-03 DIAGNOSIS — S39.94XA TRAUMATIC INJURY OF EXTERNAL GENITALIA, INITIAL ENCOUNTER: ICD-10-CM

## 2024-07-03 PROCEDURE — G2211 COMPLEX E/M VISIT ADD ON: HCPCS

## 2024-07-03 PROCEDURE — 99214 OFFICE O/P EST MOD 30 MIN: CPT | Mod: GC

## 2024-07-03 RX ORDER — ESCITALOPRAM OXALATE 5 MG/1
5 TABLET ORAL DAILY
Qty: 90 TABLET | Refills: 0 | Status: SHIPPED | OUTPATIENT
Start: 2024-07-03 | End: 2024-07-29

## 2024-07-03 NOTE — PROGRESS NOTES
Assessment & Plan     Anxiety  Improvement on mental status exam today since initiation of lexapro with consulting psychiatry provider. Continues with weekly psychotherapy, which is indicated.   - escitalopram (LEXAPRO) 5 MG tablet; Take 1 tablet (5 mg) by mouth daily    Gender incongruence  On GAHT (Gender Affirming Hormone treatment) since July 2022, currently on 3mg estradiol valerate (0.15mL), shot day Sunday.   Medication changes today? No  Labs today? No   Follow up schedule: Labs in 2 weeks (1 month after dose change), visit in 1 month     Letter provided today via [employer] to support process of legal name change.     Jessica engaged in the decision making process and verbalized understanding of the options discussed and agreed with the final plan. All coexisting medical and mental health conditions that could interfere with treatment have been addressed and are reasonably controlled at this time. They have capacity to weigh risks and benefits and understand consequences of treatment and non-treatment. They have previously been counseled on long term impacts of fertility, and offered resources on fertility preservation during prior visit(s).     - Estradiol; Future  - Testosterone total; Future    Traumatic injury of external genitalia  Mentioned this for the first time today. No recent injury. Desires orchiectomy in the future. May be helpful to do exam at a future visit to assess for current state, particularly if Jessica is worried.     The longitudinal plan of care for the diagnosis(es)/condition(s) as documented were addressed during this visit. Due to the added complexity in care, I will continue to support Jessica in the subsequent management and with ongoing continuity of care.    Social determinants of health: member of transgender community    Return for Mid-cycle labs in 2 weeks, visit in 4 weeks.    Subjective   Jessica is a 48 year old, presenting for the following health issues:  Follow Up (Psych  "care, letter for court for gcs)        7/3/2024    11:23 AM   Additional Questions   Roomed by Dixie   Accompanied by self         7/3/2024   Forms   Any forms needing to be completed Yes          7/3/2024    11:23 AM   Patient Reported Additional Medications   Patient reports taking the following new medications escitalopram 5mg, hydroxyzine 25mg         7/3/2024    Information    services provided? No      HPI     Anxiety:   Psych visit went well. Started selective serotonin reuptake inhibitor.     Sometimes still pretty hard on herself. Feels embarassed about it, sometimes a short fuse.     Seeing a therapist weekly, will try to work on coping tools with them.         Gender:   Decreased E dose to 0.15mL (3mg) weekly after last lab showed supra-therapeutic E on 0.2mL. Feeling same overall.     Taking progesterone at night now (had been taking CA mid-day)     History of genital self-harm. Hard a ruptured testicle. Hasn't had that checked out by a dotor since she was an adolescent. Looking forward to orchiectomy. Wonders if T is suppressed on lower doses of E than other pts because of prior genital trauma.     Has a court date for name change.         Objective    /67   Pulse 73   Temp 98.9  F (37.2  C) (Oral)   Resp 16   Ht 1.727 m (5' 8\")   Wt 79.4 kg (175 lb 1.6 oz)   SpO2 97%   BMI 26.62 kg/m    Body mass index is 26.62 kg/m .  Physical Exam  Constitutional:       General: She is not in acute distress.  Neurological:      Mental Status: She is alert.   Psychiatric:         Attention and Perception: Attention normal.         Mood and Affect: Mood is anxious.         Behavior: Behavior normal. Behavior is cooperative.         Thought Content: Thought content normal.         Judgment: Judgment normal.             Signed Electronically by: LUIS ANGEL Holder MD    "

## 2024-07-03 NOTE — LETTER
July 3, 2024        RE: Jessica CONROY Claytonsamantha Holder MD    I, LUIS ANGEL Holder MD [place] permit # [id number]  am the physician of Jessica Noyola (legal name Blayne Noyola), with whom I have a doctor/patient relationship and whom I have treated. Jessica (Blayne Noyola)  has had appropriate clinical treatment for gender transition to the new gender: female. I am in support of all efforts to affirm my patient's gender, including by updating legal name and gender marker on all legal documents and forms of identification.    I declare under penalty of perjury under the laws of the [place] that the forgoing is true and correct.           LUIS ANGEL Holder MD

## 2024-07-04 NOTE — PATIENT INSTRUCTIONS
Jessica,     Always a pleasure to see you! Keep up the good work. We'll see where your levels are in 2 weeks, then get back together to discuss in 4 weeks. Good luck with the name change!     LUIS ANGEL Holder MD

## 2024-07-17 ENCOUNTER — LAB (OUTPATIENT)
Dept: LAB | Facility: CLINIC | Age: 49
End: 2024-07-17
Payer: COMMERCIAL

## 2024-07-17 DIAGNOSIS — F64.9 GENDER INCONGRUENCE: ICD-10-CM

## 2024-07-17 LAB — ESTRADIOL SERPL-MCNC: 346 PG/ML

## 2024-07-17 PROCEDURE — 84403 ASSAY OF TOTAL TESTOSTERONE: CPT

## 2024-07-17 PROCEDURE — 82670 ASSAY OF TOTAL ESTRADIOL: CPT

## 2024-07-17 PROCEDURE — 36415 COLL VENOUS BLD VENIPUNCTURE: CPT

## 2024-07-19 LAB — TESTOST SERPL-MCNC: 16 NG/DL (ref 8–950)

## 2024-07-29 ENCOUNTER — VIRTUAL VISIT (OUTPATIENT)
Dept: PSYCHIATRY | Facility: CLINIC | Age: 49
End: 2024-07-29
Payer: COMMERCIAL

## 2024-07-29 DIAGNOSIS — F43.10 PTSD (POST-TRAUMATIC STRESS DISORDER): ICD-10-CM

## 2024-07-29 DIAGNOSIS — F64.0 GENDER DYSPHORIA IN ADULT: Primary | ICD-10-CM

## 2024-07-29 DIAGNOSIS — F41.9 ANXIETY: ICD-10-CM

## 2024-07-29 PROCEDURE — 99214 OFFICE O/P EST MOD 30 MIN: CPT | Mod: 95 | Performed by: STUDENT IN AN ORGANIZED HEALTH CARE EDUCATION/TRAINING PROGRAM

## 2024-07-29 RX ORDER — PROPRANOLOL HYDROCHLORIDE 20 MG/1
20 TABLET ORAL 3 TIMES DAILY
Qty: 90 TABLET | Refills: 0 | Status: SHIPPED | OUTPATIENT
Start: 2024-07-29 | End: 2024-07-31

## 2024-07-29 RX ORDER — ESCITALOPRAM OXALATE 10 MG/1
10 TABLET ORAL DAILY
Qty: 30 TABLET | Refills: 0 | Status: SHIPPED | OUTPATIENT
Start: 2024-07-29 | End: 2024-09-09
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
## 2024-07-30 ENCOUNTER — TELEPHONE (OUTPATIENT)
Dept: PSYCHIATRY | Facility: CLINIC | Age: 49
End: 2024-07-30
Payer: COMMERCIAL

## 2024-07-30 DIAGNOSIS — F41.9 ANXIETY: ICD-10-CM

## 2024-07-30 NOTE — TELEPHONE ENCOUNTER
JOSE received faxed 90 day supply request for propranolol (INDERAL) 20 MG tablet            Siomara Antonio CMA July 30, 2024

## 2024-07-31 RX ORDER — PROPRANOLOL HYDROCHLORIDE 20 MG/1
20 TABLET ORAL 3 TIMES DAILY
Qty: 270 TABLET | Refills: 0 | Status: SHIPPED | OUTPATIENT
Start: 2024-07-31 | End: 2024-09-09

## 2024-08-06 ENCOUNTER — OFFICE VISIT (OUTPATIENT)
Dept: FAMILY MEDICINE | Facility: CLINIC | Age: 49
End: 2024-08-06
Payer: COMMERCIAL

## 2024-08-06 VITALS
DIASTOLIC BLOOD PRESSURE: 72 MMHG | SYSTOLIC BLOOD PRESSURE: 110 MMHG | WEIGHT: 177 LBS | HEART RATE: 71 BPM | HEIGHT: 68 IN | OXYGEN SATURATION: 97 % | BODY MASS INDEX: 26.83 KG/M2 | TEMPERATURE: 97.5 F | RESPIRATION RATE: 19 BRPM

## 2024-08-06 DIAGNOSIS — F64.9 GENDER INCONGRUENCE: Primary | ICD-10-CM

## 2024-08-06 PROCEDURE — 99214 OFFICE O/P EST MOD 30 MIN: CPT | Mod: GC

## 2024-08-06 PROCEDURE — G2211 COMPLEX E/M VISIT ADD ON: HCPCS

## 2024-08-06 RX ORDER — ESTRADIOL VALERATE 20 MG/ML
INJECTION INTRAMUSCULAR
Qty: 5 ML | Refills: 3 | Status: SHIPPED | OUTPATIENT
Start: 2024-08-06

## 2024-08-06 RX ORDER — SYRINGE WITH NEEDLE, 1 ML 25GX5/8"
SYRINGE, EMPTY DISPOSABLE MISCELLANEOUS
Qty: 100 EACH | Refills: 1 | Status: SHIPPED | OUTPATIENT
Start: 2024-08-06

## 2024-08-06 RX ORDER — NEEDLES, DISPOSABLE 25GX5/8"
NEEDLE, DISPOSABLE MISCELLANEOUS
Qty: 100 EACH | Refills: 1 | Status: SHIPPED | OUTPATIENT
Start: 2024-08-06

## 2024-08-06 NOTE — PROGRESS NOTES
Assessment & Plan     Gender Incongruence  Estradiol supra-therapeutic despite dose decrease from 4mg to 3mg weekly. Shot day . Good adherence. Testosterone continues to be suppressed. No issues with progesterone. Decreasing dose from 3mg to 2mg weekly (0.1mL). Will follow-up with labs in 6 weeks, visit in 3 months, or earlier as-needed. Legal name change hearing this month! Thinking about orchiectomy, will place new referral if current one is  by the time she is ready to proceed.   -estradiol valerate (DELESTROGEN) 20 MG/ML injection. INJECT 0.1 ML (2mg) INTO THE MUSCLE ONCE A WEEK     Anxiety  Jessica meets with a therapist and psychiatrist to manage her anxiety. No current/acute concerns about that at this visit.  -Lexapro dose was recently increased to 10 mg by her psychiatry provider. Will continue seeing them.     Social determinants of health: Member of transgender community    The longitudinal plan of care for the diagnosis(es)/condition(s) as documented were addressed during this visit. Due to the added complexity in care, I will continue to support Jessica in the subsequent management and with ongoing continuity of care.      Return for 3 month visit, 6 weeks labs (mid-cycle) .    Subjective   Jessica is a 49 year old, presenting for the following health issues:  RECHECK      2024    10:59 AM   Additional Questions   Roomed by cornell   Accompanied by self         2024    10:59 AM   Patient Reported Additional Medications   Patient reports taking the following new medications none         2024    Information    services provided? No      HPI     -Jessica is here for a follow up and to talk about Estrogen lab values and possible changes in E dose.  -Been taking estrogen shots every   -Progesterone pills daily  -Been feeling fine on 0.15 mL of E.  -Jessica is okay with either staying on the same dos or lowering it if provider thinks it is appropriate.    She has  "a court date on Thursday (8/8/24) for a legal name change.    She sees a therapist and a psychiatrist for anxiety. Psych doctor increased the Lexapro dose to 10mg around 5 days ago.    Shot day: Sunday    Review of Systems  Constitutional, neuro, ENT, endocrine, pulmonary, cardiac, gastrointestinal, genitourinary, musculoskeletal, and psychiatric systems are negative, except as otherwise noted.      Objective    /72 (BP Location: Left arm, Patient Position: Sitting, Cuff Size: Adult Regular)   Pulse 71   Temp 97.5  F (36.4  C)   Resp 19   Ht 1.727 m (5' 8\")   Wt 80.3 kg (177 lb)   SpO2 97%   BMI 26.91 kg/m    Body mass index is 26.91 kg/m .    Physical Exam  Constitutional:       Appearance: Normal appearance.   Cardiovascular:      Rate and Rhythm: Normal rate.   Pulmonary:      Effort: Pulmonary effort is normal.   Neurological:      Mental Status: She is alert and oriented to person, place, and time.   Psychiatric:         Mood and Affect: Mood is anxious.         Behavior: Behavior normal.         Thought Content: Thought content normal.         Judgment: Judgment normal.         Darby Pabon, MS3  [employer] [employer] [facility] [employer]    Preceptor Attestation:   Patient seen, evaluated and discussed with the medical student . I have verified the content of the note, which accurately reflects my assessment of the patient and the plan of care.     Supervising Physician:  LUIS ANGEL Holder MD       Signed Electronically by: LUIS ANGEL Holder MD    "

## 2024-08-06 NOTE — PATIENT INSTRUCTIONS
Patient Education   Here is the plan from today's visit    1. Gender incongruence    We'll try 6 weeks of the 0.1mL (2mg) dose, and check levels after that. If all is otherwise going well, we can make the decision to keep that dose or increase back to 0.15mL (3mg) via [employer] once your labs are back. I'll plan to see you in 3 months for a visit, or earlier if something comes up and you'd like to have a visit.     Congratulations on the upcoming name change! So happy for you.    Follow up plan  Return for 3 month visit, 6 weeks labs (mid-cycle) .    Thank you for coming to [facility] today.  Lab Testing:  **If you had lab testing today and your results are reassuring or normal they will be mailed to you or sent through [facility] within 7 days.   **If the lab tests need quick action we will call you with the results.  **If you are having labs done on a different day, please call [phone] to schedule at [facility] or [phone] for other [facility] locations. Labs do not offer walk-in appointments.  The phone number we will call with results is # [phone] (home) . If this is not the best number please call our clinic and change the number.  Medication Refills:  If you need any refills please call your pharmacy and they will contact us.   If you need to  your refill at a new pharmacy, please contact the new pharmacy directly. The new pharmacy will help you get your medications transferred faster.   Scheduling:  If you have any concerns about today's visit or wish to schedule another appointment please call our office during normal business hours [phone] (8-5:00 M-F). If you can no longer make a scheduled visit, please cancel via [facility] or call us to cancel.   If a referral was made to an [facility] specialty provider and you do not get a call from central scheduling, please refer to directions on your visit summary or call our office during normal business hours for  assistance.   If a Mammogram was ordered for you at the [facility] call [phone] to schedule or change your appointment.  If you had an XRay/CT/Ultrasound/MRI ordered the number is [phone] to schedule or change your radiology appointment.   [facility] has limited ultrasound appointments available on Wednesdays, if you would like your ultrasound at [facility], please call [phone] to schedule.   Medical Concerns:  If you have urgent medical concerns please call [phone] at any time of the day.    LUIS ANGEL Holder MD

## 2024-09-09 ENCOUNTER — VIRTUAL VISIT (OUTPATIENT)
Dept: PSYCHIATRY | Facility: CLINIC | Age: 49
End: 2024-09-09
Payer: COMMERCIAL

## 2024-09-09 DIAGNOSIS — F43.10 PTSD (POST-TRAUMATIC STRESS DISORDER): Primary | ICD-10-CM

## 2024-09-09 DIAGNOSIS — F41.9 ANXIETY: ICD-10-CM

## 2024-09-09 PROCEDURE — 99214 OFFICE O/P EST MOD 30 MIN: CPT | Mod: 95 | Performed by: STUDENT IN AN ORGANIZED HEALTH CARE EDUCATION/TRAINING PROGRAM

## 2024-09-09 RX ORDER — ESCITALOPRAM OXALATE 10 MG/1
10 TABLET ORAL DAILY
Qty: 90 TABLET | Refills: 0 | Status: SHIPPED | OUTPATIENT
Start: 2024-09-09 | End: 2024-12-08

## 2024-09-09 RX ORDER — PROPRANOLOL HYDROCHLORIDE 20 MG/1
20 TABLET ORAL 3 TIMES DAILY PRN
Qty: 90 TABLET | Refills: 0 | Status: SHIPPED | OUTPATIENT
Start: 2024-09-09

## 2024-09-09 NOTE — NURSING NOTE
Is the patient currently in the state of [place]? YES    Current patient location:  [facility] in [place]    Visit mode:VIDEO    If the visit is dropped, the patient can be reconnected by: VIDEO VISIT: Text to cell phone:   Telephone Information:   Mobile [phone]       Will anyone else be joining the visit? No  (If patient encounters technical issues they should call [phone])    How would you like to obtain your AVS? MyChart    Are changes needed to the allergy or medication list? Pt stated no changes to allergies and Pt stated no med changes    Are refills needed on medications prescribed by this physician? YES    Rooming Documentation: Questionnaire(s) completed.    Reason for visit: BEST [place], VVF
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
## 2024-09-09 NOTE — Clinical Note
This patient has completed consultation with [facility] and is ready to return to you for ongoing refills. A 90-day supply of their medication has been ordered.    I have requested that they schedule a mental health check-in with you within 90 days of today.      Future care recommendations are included in my note under RETURN TO REFERRING PROVIDER FINAL TREATMENT PLAN.     If you have any questions or concerns, please contact me. You can send me a staff message or secure chat with specific consultation questions about this patient for the next 6 months. You can also request a Behavioral Health eConsult or seek long-term psychiatric assistance. They can be referred to [facility] after 6 months.

## 2024-09-17 ENCOUNTER — LAB (OUTPATIENT)
Dept: LAB | Facility: CLINIC | Age: 49
End: 2024-09-17
Payer: COMMERCIAL

## 2024-09-17 DIAGNOSIS — F64.9 GENDER INCONGRUENCE: ICD-10-CM

## 2024-09-17 LAB
ESTRADIOL SERPL-MCNC: 174 PG/ML
HBA1C MFR BLD: 5.2 % (ref 0–5.6)

## 2024-09-17 PROCEDURE — 36415 COLL VENOUS BLD VENIPUNCTURE: CPT

## 2024-09-17 PROCEDURE — 82670 ASSAY OF TOTAL ESTRADIOL: CPT

## 2024-09-17 PROCEDURE — 84403 ASSAY OF TOTAL TESTOSTERONE: CPT

## 2024-09-17 PROCEDURE — 83036 HEMOGLOBIN GLYCOSYLATED A1C: CPT

## 2024-09-17 NOTE — PATIENT INSTRUCTIONS
Moving from: Collaborative Care Psychiatry Service ([facility])    Moving to: Referring Provider        We are returning your care back to your Referring Provider.      We will update your Referring Provider/Clinic that you've completed your care with [facility]. This way, they can help you build on the progress you've made in your mental health.        Here's what happens next:    Within the next 3 months: Please set up a visit with your referring provider. Ask for a mental health check-in.  Stay on your current medications--do not change your doses. Your symptoms could get worse if you quickly stop or decrease your medicine.  We've refilled your mental health medications for the next 3 months (90 days). Future refills or dose changes should come from your Referring Provider Clinic.    If you're in therapy, keep it up! If you're not but would like to start, ask your Referring Provider clinic for a referral to therapy, or call [facility] ([phone]) to set up a visit with a therapist.        It's been a pleasure to work with you! If you and your clinic decide that you should return to [facility] in the future, we remain ready to serve you. Ask your clinic for a new referral if needed.

## 2024-09-20 LAB — TESTOST SERPL-MCNC: 15 NG/DL (ref 8–950)

## 2024-09-24 ENCOUNTER — DOCUMENTATION ONLY (OUTPATIENT)
Dept: FAMILY MEDICINE | Facility: CLINIC | Age: 49
End: 2024-09-24
Payer: COMMERCIAL

## 2024-10-16 ENCOUNTER — OFFICE VISIT (OUTPATIENT)
Dept: FAMILY MEDICINE | Facility: CLINIC | Age: 49
End: 2024-10-16
Payer: COMMERCIAL

## 2024-10-16 VITALS
OXYGEN SATURATION: 96 % | HEIGHT: 68 IN | BODY MASS INDEX: 29.62 KG/M2 | WEIGHT: 195.47 LBS | DIASTOLIC BLOOD PRESSURE: 74 MMHG | SYSTOLIC BLOOD PRESSURE: 117 MMHG | TEMPERATURE: 98 F | RESPIRATION RATE: 14 BRPM | HEART RATE: 77 BPM

## 2024-10-16 DIAGNOSIS — F41.9 ANXIETY: ICD-10-CM

## 2024-10-16 DIAGNOSIS — F64.9 GENDER INCONGRUENCE: ICD-10-CM

## 2024-10-16 DIAGNOSIS — Z23 ENCOUNTER FOR IMMUNIZATION: Primary | ICD-10-CM

## 2024-10-16 PROCEDURE — 99214 OFFICE O/P EST MOD 30 MIN: CPT | Mod: 25

## 2024-10-16 PROCEDURE — 90656 IIV3 VACC NO PRSV 0.5 ML IM: CPT

## 2024-10-16 PROCEDURE — 90480 ADMN SARSCOV2 VAC 1/ONLY CMP: CPT

## 2024-10-16 PROCEDURE — 91320 SARSCV2 VAC 30MCG TRS-SUC IM: CPT

## 2024-10-16 PROCEDURE — 90471 IMMUNIZATION ADMIN: CPT

## 2024-10-16 RX ORDER — NEEDLES, DISPOSABLE 25GX5/8"
NEEDLE, DISPOSABLE MISCELLANEOUS
Qty: 100 EACH | Refills: 3 | Status: SHIPPED | OUTPATIENT
Start: 2024-10-16

## 2024-10-16 RX ORDER — ESCITALOPRAM OXALATE 10 MG/1
10 TABLET ORAL DAILY
Qty: 90 TABLET | Refills: 3 | Status: SHIPPED | OUTPATIENT
Start: 2024-10-16

## 2024-10-16 RX ORDER — ESTRADIOL VALERATE 20 MG/ML
INJECTION INTRAMUSCULAR
Qty: 5 ML | Refills: 3 | Status: SHIPPED | OUTPATIENT
Start: 2024-10-16

## 2024-10-16 RX ORDER — SYRINGE WITH NEEDLE, 1 ML 25GX5/8"
SYRINGE, EMPTY DISPOSABLE MISCELLANEOUS
Qty: 100 EACH | Refills: 1 | Status: SHIPPED | OUTPATIENT
Start: 2024-10-16

## 2024-10-16 RX ORDER — PROGESTERONE 200 MG/1
200 CAPSULE ORAL AT BEDTIME
Qty: 90 CAPSULE | Refills: 3 | Status: SHIPPED | OUTPATIENT
Start: 2024-10-16

## 2024-10-16 NOTE — PROGRESS NOTES
"Assessment & Plan     Anxiety  Doing well from this perspective. Continue without changes. Has not taken PRN propranolol. Continues with 2 psychotherapists.   - escitalopram (LEXAPRO) 10 MG tablet; Take 1 tablet (10 mg) by mouth daily.    Gender incongruence  Doing quite well overall. Primary unmet goals relate to genital-related dysphoria. Hopes for orchiectomy and eventually vaginoplasty. Putting in new referral for orchiectomy given previous one will  soon. She is looking into options for hair removal, knows this is a prolonged step required before vaginoplasty. Labs reassuring last month, will follow-up with mid-cycle labs a few weeks before next visit, in 6 months.  - Comprehensive Gender Care Referral - Internal; Future  - progesterone (PROMETRIUM) 200 MG capsule; Take 1 capsule (200 mg) by mouth at bedtime.  - estradiol valerate (DELESTROGEN) 20 MG/ML injection; INJECT 0.1 ML (2mg) INTO THE MUSCLE ONCE A WEEK  - syringe/needle, disp, (B-D LUER-JEFFREY SYRINGE) 25G X 1\" 3 ML MISC; USE FOR WEEKLY ESTROGEN INJECTIONS to draw up medication.  - Testosterone total; Future  - Estradiol; Future  - Hemoglobin; Future    Encounter for immunization  Flu and Covid today. System prompts to immunize for Hep B. 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 Will defer Hep B shot today and add titers on to her 6 month gender labs.   - INFLUENZA VACCINE,SPLIT VIRUS,TRIVALENT,PF(FLUZONE)  - COVID-19 12+ (PFIZER)  - Hepatitis B surface antigen; Future  - Hepatitis B Surface Antibody; Future  - Hepatitis B core antibody; Future    Social determinants affecting health status and the provision of healthcare: Member of transgender community    The longitudinal plan of care for the diagnosis(es)/condition(s) as documented were addressed during this visit. Due to the added complexity in care, I will continue to support Jessica in the subsequent management and with ongoing continuity " of care.    Return in about 6 months (around 2025) for Follow up with mid-cycle labs about 2 weeks beforehand .    Subjective   Jessica is a 49 year old, presenting for the following health issues:  Follow Up        10/16/2024    11:31 AM   Additional Questions   Roomed by Marek         10/16/2024    Information    services provided? No        HPI     Dad passed, processing grief, complicated.     Things moving forward for legal name change, one step left. Social security stuff is changed now.     Jessica is a 49 year old individual that uses pronouns She/Her/Hers/Herself that presents today for follow up of gender affirming hormone therapy for gender affirmation.     S/p gonadectomy? No    Embodiment goals: Orchiectomy, eventually wants vaginoplasty, hair removal beforehand      Interval history  Currently takin.1mL 2mg   Shot day:    Missed doses?  No    Overall things are going: Fairly well,   What changes have you noticed?   Fat redistribution: Yes - some weight gain with recent stressors  Changes in hair:  No  Change in libido: Yes - not much  Breast changes/development: Yes - tender sometimes   Decreased spontaneous erections: No - doesn't really have them    Pace of changes: hoping some things would be faster, such as birth certificate, and orchiectomy.   Unmet embodiment goals?  Orchiectomy, vaginoplasty. Has some genital-related gender dysphoria. Tucking is gender-affirming, wonders if that sometimes contributes to back pain.   Any changes you are not liking? No    Side effects?   Acne or oily skin: No  Chest Pains: No  Shortness of breath: No  Leg swelling: No  Hot flashes:  No  Mood swings: No  Migraine: No  Genital pain: No  Bothersome changes in sexual experience or libido: No    Sexual Health  - New sexual partners: No    Social history  Smoking? No  Alcohol use?Yes had a few drinks with mom     Mental Health        2024     9:16 AM 2024     4:08 PM   PHQ  "  PHQ-9 Total Score 4 4   Q9: Thoughts of better off dead/self-harm past 2 weeks Not at all Not at all     No SI/SH. Doing great from a mood perspective.     Working with 2 therapists - a regular one and PTSD.         6/12/2024     9:16 AM   IRMA-7 SCORE   Total Score 5 (mild anxiety)   Total Score 5         Objective    /74   Pulse 77   Temp 98  F (36.7  C) (Temporal)   Resp 14   Ht 1.727 m (5' 8\")   Wt 88.7 kg (195 lb 7.5 oz)   SpO2 96%   BMI 29.72 kg/m    Body mass index is 29.72 kg/m .  Physical Exam  Constitutional:       General: She is not in acute distress.     Appearance: She is not toxic-appearing.   Cardiovascular:      Rate and Rhythm: Normal rate.   Pulmonary:      Effort: Pulmonary effort is normal. No respiratory distress.   Neurological:      Mental Status: She is alert. Mental status is at baseline.   Psychiatric:         Mood and Affect: Mood normal.         Behavior: Behavior normal.          Lab on 09/17/2024   Component Date Value Ref Range Status    Testosterone Total 09/17/2024 15  8 - 950 ng/dL Final    Estradiol 09/17/2024 174  pg/mL Final    Healthy Men:   11.3-43.2 pg/mL    Healthy Postmenopausal Women:  Postmenopause: <5-138 pg/mL    Healthy Pregnant Women:  1st trimester: 154-3243 pg/mL  2nd trimester: 1561-18725 pg/mL  3rd trimester: 8525->91453 pg/mL    Healthy Women Cycle Phase:  Follicular: 30.9-90.4 pg/mL  Ovulation: 60.4-533 pg/mL  Luteal: 60.4-232 pg/mL    Healthy Women Cycle Sub-Phase:  Early Follicular: 20.5-62.8 pg/mL  Intermediate Follicular: 26-79.8 pg/mL  Late Follicular: 49.5-233 pg/mL  Ovulation: 60.4-602 pg/mL  Early Luteal: 51.1-179 pg/mL  Intermediate Luteal: 66.5-305 pg/mL  Late Luteal: 30.2-222 pg/mL    Hemoglobin A1C 09/17/2024 5.2  0.0 - 5.6 % Final    Normal <5.7%   Prediabetes 5.7-6.4%    Diabetes 6.5% or higher     Note: Adopted from ADA consensus guidelines.           Signed Electronically by: Maura Holder MD    "

## 2024-10-16 NOTE — PATIENT INSTRUCTIONS
"Patient Education   Here is the plan from today's visit    1. Anxiety  Keep up the good work. If things get harder and you want to talk about adjusting your medication, let me know.   - escitalopram (LEXAPRO) 10 MG tablet; Take 1 tablet (10 mg) by mouth daily.  Dispense: 90 tablet; Refill: 3    2. Gender incongruence  I put in a new orchiectomy referral - you can schedule that anytime. You should get a call in the next week or so, or maybe a D-Share message. Congrats on the name change!!  - Comprehensive Gender Care Referral - Internal; Future  - progesterone (PROMETRIUM) 200 MG capsule; Take 1 capsule (200 mg) by mouth at bedtime.  Dispense: 90 capsule; Refill: 3  - estradiol valerate (DELESTROGEN) 20 MG/ML injection; INJECT 0.1 ML (2mg) INTO THE MUSCLE ONCE A WEEK  Dispense: 5 mL; Refill: 3  - syringe/needle, disp, (B-D LUER-JEFFREY SYRINGE) 25G X 1\" 3 ML MISC; USE FOR WEEKLY ESTROGEN INJECTIONS to draw up medication.  Dispense: 100 each; Refill: 1  - Testosterone total; Future  - Estradiol; Future  - Hemoglobin; Future    3. Encounter for immunization  We will check with your next labs if you are immune to Hep B or not.   - INFLUENZA VACCINE,SPLIT VIRUS,TRIVALENT,PF(FLUZONE)  - COVID-19 12+ (PFIZER)  - Hepatitis B surface antigen; Future  - Hepatitis B Surface Antibody; Future  - Hepatitis B core antibody; Future    Follow up plan  Return in about 6 months (around 4/16/2025) for Follow up with mid-cycle labs about 2 weeks beforehand .    Thank you for coming to [facility] today.  Lab Testing:  **If you had lab testing today and your results are reassuring or normal they will be mailed to you or sent through D-Share within 7 days.   **If the lab tests need quick action we will call you with the results.  **If you are having labs done on a different day, please call [phone] to schedule at [facility] or [phone] for other [facility] locations. Labs do not offer walk-in " appointments.  The phone number we will call with results is # [phone] (home) . If this is not the best number please call our clinic and change the number.  Medication Refills:  If you need any refills please call your pharmacy and they will contact us.   If you need to  your refill at a new pharmacy, please contact the new pharmacy directly. The new pharmacy will help you get your medications transferred faster.   Scheduling:  If you have any concerns about today's visit or wish to schedule another appointment please call our office during normal business hours [phone] (8-5:00 M-F). If you can no longer make a scheduled visit, please cancel via [facility] or call us to cancel.   If a referral was made to an [facility] specialty provider and you do not get a call from central scheduling, please refer to directions on your visit summary or call our office during normal business hours for assistance.   If a Mammogram was ordered for you at the Breast Center call [phone] to schedule or change your appointment.  If you had an XRay/CT/Ultrasound/MRI ordered the number is [phone] to schedule or change your radiology appointment.   [facility] has limited ultrasound appointments available on Wednesdays, if you would like your ultrasound at [facility], please call [phone] to schedule.   Medical Concerns:  If you have urgent medical concerns please call [phone] at any time of the day.    Maura Holder MD

## 2024-10-18 ENCOUNTER — TELEPHONE (OUTPATIENT)
Dept: PLASTIC SURGERY | Facility: CLINIC | Age: 49
End: 2024-10-18
Payer: COMMERCIAL

## 2024-10-18 NOTE — CONFIDENTIAL NOTE
Writer YUMIKO re: referral from Dr. Herbert. Pt referred for orchiectomy. If pt still has [employer], will need to come for in-person exam.

## 2024-10-21 ENCOUNTER — TELEPHONE (OUTPATIENT)
Dept: PLASTIC SURGERY | Facility: CLINIC | Age: 49
End: 2024-10-21
Payer: COMMERCIAL

## 2024-10-21 NOTE — CONFIDENTIAL NOTE
Writer LVARMEN re: referral from Dr. Herbert. Pt referred to move forward with orchiectomy. Double check pt primary insurance. If [employer] is the primary, pt will need to come in person.

## 2024-11-17 ENCOUNTER — HEALTH MAINTENANCE LETTER (OUTPATIENT)
Age: 49
End: 2024-11-17

## 2024-11-18 DIAGNOSIS — F64.9 GENDER INCONGRUENCE: Primary | ICD-10-CM

## 2024-11-18 NOTE — TELEPHONE ENCOUNTER
"I queued med  Pt is requesting progesterone (PROMETRIUM) 100 MG capsule       Request for medication refill:  progesterone (PROMETRIUM) 100 MG capsule     Providers if patient needs an appointment and you are willing to give a one month supply please refill for one month and  send a letter/MyChart using \".SMILLIMITEDREFILL\" .smillimited and route chart to \"P SMI \" (Giving one month refill in non controlled medications is strongly recommended before denial)    If refill has been denied, meaning absolutely no refills without visit, please complete the smart phrase \".smirxrefuse\" and route it to the \"P SMI MED REFILLS\"  pool to inform the patient and the pharmacy.    Codie Birmingham CMA      "

## 2024-11-20 RX ORDER — PROGESTERONE 100 MG/1
200 CAPSULE ORAL AT BEDTIME
Qty: 180 CAPSULE | Refills: 3 | Status: SHIPPED | OUTPATIENT
Start: 2024-11-20

## 2025-07-24 ENCOUNTER — ORDERS ONLY (AUTO-RELEASED) (OUTPATIENT)
Dept: FAMILY MEDICINE | Facility: CLINIC | Age: 50
End: 2025-07-24

## 2025-07-24 ENCOUNTER — OFFICE VISIT (OUTPATIENT)
Dept: FAMILY MEDICINE | Facility: CLINIC | Age: 50
End: 2025-07-24
Payer: COMMERCIAL

## 2025-07-24 VITALS
HEART RATE: 85 BPM | SYSTOLIC BLOOD PRESSURE: 129 MMHG | OXYGEN SATURATION: 96 % | WEIGHT: 201.8 LBS | TEMPERATURE: 97.8 F | DIASTOLIC BLOOD PRESSURE: 75 MMHG | BODY MASS INDEX: 30.58 KG/M2 | RESPIRATION RATE: 14 BRPM | HEIGHT: 68 IN

## 2025-07-24 DIAGNOSIS — Z71.85 IMMUNIZATION COUNSELING: ICD-10-CM

## 2025-07-24 DIAGNOSIS — F41.9 ANXIETY: ICD-10-CM

## 2025-07-24 DIAGNOSIS — Z12.11 SCREEN FOR COLON CANCER: ICD-10-CM

## 2025-07-24 DIAGNOSIS — F64.9 GENDER INCONGRUENCE: Primary | ICD-10-CM

## 2025-07-24 LAB
ESTRADIOL SERPL-MCNC: 130 PG/ML
HBV CORE AB SERPL QL IA: NONREACTIVE
HBV SURFACE AB SERPL IA-ACNC: <3.5 M[IU]/ML
HBV SURFACE AB SERPL IA-ACNC: NONREACTIVE M[IU]/ML
HBV SURFACE AG SERPL QL IA: NONREACTIVE
HGB BLD-MCNC: 13.3 G/DL (ref 11.7–17.7)
MCV RBC AUTO: 95 FL (ref 78–100)

## 2025-07-24 PROCEDURE — 84403 ASSAY OF TOTAL TESTOSTERONE: CPT

## 2025-07-24 PROCEDURE — 3074F SYST BP LT 130 MM HG: CPT

## 2025-07-24 PROCEDURE — G2211 COMPLEX E/M VISIT ADD ON: HCPCS

## 2025-07-24 PROCEDURE — 87340 HEPATITIS B SURFACE AG IA: CPT

## 2025-07-24 PROCEDURE — 85018 HEMOGLOBIN: CPT

## 2025-07-24 PROCEDURE — 99214 OFFICE O/P EST MOD 30 MIN: CPT | Mod: GC

## 2025-07-24 PROCEDURE — 36415 COLL VENOUS BLD VENIPUNCTURE: CPT

## 2025-07-24 PROCEDURE — 86704 HEP B CORE ANTIBODY TOTAL: CPT

## 2025-07-24 PROCEDURE — 82670 ASSAY OF TOTAL ESTRADIOL: CPT

## 2025-07-24 PROCEDURE — 86706 HEP B SURFACE ANTIBODY: CPT

## 2025-07-24 PROCEDURE — 3078F DIAST BP <80 MM HG: CPT

## 2025-07-24 RX ORDER — PROPRANOLOL HCL 20 MG
20 TABLET ORAL 3 TIMES DAILY PRN
Qty: 90 TABLET | Refills: 0 | Status: SHIPPED | OUTPATIENT
Start: 2025-07-24

## 2025-07-24 RX ORDER — NEEDLES, DISPOSABLE 25GX5/8"
NEEDLE, DISPOSABLE MISCELLANEOUS
Qty: 100 EACH | Refills: 1 | Status: SHIPPED | OUTPATIENT
Start: 2025-07-24

## 2025-07-24 RX ORDER — ESCITALOPRAM OXALATE 10 MG/1
10 TABLET ORAL DAILY
Qty: 90 TABLET | Refills: 3 | Status: SHIPPED | OUTPATIENT
Start: 2025-07-24

## 2025-07-24 RX ORDER — ESTRADIOL VALERATE 20 MG/ML
INJECTION INTRAMUSCULAR
Qty: 5 ML | Refills: 3 | Status: SHIPPED | OUTPATIENT
Start: 2025-07-24

## 2025-07-24 RX ORDER — SYRINGE WITH NEEDLE, 1 ML 25GX5/8"
SYRINGE, EMPTY DISPOSABLE MISCELLANEOUS
Qty: 100 EACH | Refills: 1 | Status: SHIPPED | OUTPATIENT
Start: 2025-07-24

## 2025-07-24 RX ORDER — PROGESTERONE 200 MG/1
200 CAPSULE ORAL AT BEDTIME
Qty: 90 CAPSULE | Refills: 3 | Status: SHIPPED | OUTPATIENT
Start: 2025-07-24

## 2025-07-24 RX ORDER — NEEDLES, DISPOSABLE 25GX5/8"
NEEDLE, DISPOSABLE MISCELLANEOUS
Qty: 100 EACH | Refills: 3 | Status: SHIPPED | OUTPATIENT
Start: 2025-07-24

## 2025-07-24 NOTE — PROGRESS NOTES
"  Assessment & Plan     Gender incongruence  Continue hormone therapy with estrogen 0.1 mL (2 mg) weekly and daily progesterone. Pt to pursue hair removal as she strongly desires orchiectomy and vaginoplasty in the future.   - syringe/needle, disp, (B-D LUER-JEFFREY SYRINGE) 25G X 1\" 3 ML MISC; USE FOR WEEKLY ESTROGEN INJECTIONS to draw up medication.  - Needle, Disp, (B-D DISP NEEDLE) 25G X 1\" MISC; Use to weekly inject estrogen  - BD HYPODERMIC NEEDLE 18G X 1\" MISC; INJECT 1 SYRINGE INTO MUSCLE ONCE A WEEK  - estradiol valerate (DELESTROGEN) 20 MG/ML injection; INJECT 0.1 ML (2mg) INTO THE MUSCLE ONCE A WEEK  - progesterone (PROMETRIUM) 200 MG capsule; Take 1 capsule (200 mg) by mouth at bedtime.  - Testosterone total  - Estradiol  - Hemoglobin    Screen for colon cancer  - COLALEX([employer]); Future    Anxiety  Symptoms uncontrolled after recent lapse in medication. Continues to work with therapy. Would be helpful to get JORGE ALBERTO for therapist to have more specific diagnosis in the future.   - escitalopram (LEXAPRO) 10 MG tablet; Take 1 tablet (10 mg) by mouth daily.  - propranolol (INDERAL) 20 MG tablet; Take 1 tablet (20 mg) by mouth 3 times daily as needed (anxiety).    Immunization counseling  Returned negative - will need full series as we were uncertain whether she had been immunized at all.  - Hepatitis B surface antigen  - Hepatitis B Surface Antibody  - Hepatitis B core antibody    Follow-up  Return in about 3 months (around 10/24/2025).      The longitudinal plan of care for the diagnosis(es)/condition(s) as documented were addressed during this visit. Due to the added complexity in care, I will continue to support Jessica in the subsequent management and with ongoing continuity of care.    Social determinants affecting health status and the provision of healthcare: Member of transgender community    Consent was obtained from the patient to use an AI documentation tool in the creation of this note. " "      Subjective   Jessica is a 49 year old, presenting for the following health issues:  Follow Up        7/24/2025     9:22 AM   Additional Questions   Roomed by Marek         7/24/2025    Information    services provided? No     HPI Jessica Noyola, age 49  - On gender-affirming hormone therapy for 3 years as of July 1, 2025; currently taking estrogen and progesterone as prescribed  - Weight gain noted over the past several months, now stable between 199-201 lbs  - No new symptoms related to hormone therapy reported  - History of mood symptoms; recently missed psychiatric medication for approximately 2.5 weeks, restarted half a week ago  - Recent episode of self-harm behavior (pounding hand on table) triggered by frustration after losing keys; mother witnessed the event and expressed concern due to past history of self-harm  - Reports feeling better since restarting medication, but still experiences negative self-talk and frustration with self over mistakes  - Denies current suicidal ideation or intent, but occasionally expresses passive thoughts of wishing not to exist  - Engaged in ongoing therapy for mood and anxiety  - No recent use of propranolol for anxiety  - No need for STI screening at this time  - Colon cancer screening kit previously received but not completed; no family history of colon cancer reported    Moving in with her mom.     Taking 0.1mL (2mg) estradiol q Sunday  Progesterone daily   Gaining some weight.     Has been on hormones for about 3 years now.     Went on vacation for a week and forgot her escitalopram. Was off of it for about 2.5 weeks, been back on for half a week.   When she was younger used to self-harm. Cutting.     Punish herself for making mistakes, Ex: lost her keys, pounded on her hand. Mom saw that, it gets her upset.   \"Stupid things, it was dumb.\"     Working with her therapist on it.      No suicidal ideation. Sometimes will feel that life is hard. Hard " "to be positive with the world these days.         Objective    /75   Pulse 85   Temp 97.8  F (36.6  C) (Temporal)   Resp 14   Ht 1.727 m (5' 8\")   Wt 91.5 kg (201 lb 12.8 oz)   SpO2 96%   BMI 30.68 kg/m    Body mass index is 30.68 kg/m .  Physical Exam  Constitutional:       General: She is not in acute distress.     Appearance: Normal appearance. She is not toxic-appearing.   Cardiovascular:      Rate and Rhythm: Normal rate.   Pulmonary:      Effort: Pulmonary effort is normal. No respiratory distress.   Neurological:      Mental Status: She is alert.   Psychiatric:         Attention and Perception: Attention normal.         Mood and Affect: Affect normal. Mood is anxious.            Signed Electronically by: Maura Holder MD    "

## 2025-07-25 NOTE — PATIENT INSTRUCTIONS
"Patient Education   Here is the plan from today's visit    1. Gender incongruence (Primary)  Continue with the same for now. Start hair removal when you're able.   - syringe/needle, disp, (B-D LUER-JEFFREY SYRINGE) 25G X 1\" 3 ML MISC; USE FOR WEEKLY ESTROGEN INJECTIONS to draw up medication.  Dispense: 100 each; Refill: 1  - Needle, Disp, (B-D DISP NEEDLE) 25G X 1\" MISC; Use to weekly inject estrogen  Dispense: 100 each; Refill: 3  - BD HYPODERMIC NEEDLE 18G X 1\" MISC; INJECT 1 SYRINGE INTO MUSCLE ONCE A WEEK  Dispense: 100 each; Refill: 1  - estradiol valerate (DELESTROGEN) 20 MG/ML injection; INJECT 0.1 ML (2mg) INTO THE MUSCLE ONCE A WEEK  Dispense: 5 mL; Refill: 3  - progesterone (PROMETRIUM) 200 MG capsule; Take 1 capsule (200 mg) by mouth at bedtime.  Dispense: 90 capsule; Refill: 3  - Testosterone total  - Estradiol  - Hemoglobin    2. Screen for colon cancer  - ESTIVEN([employer]); Future    3. Anxiety  Glad you're back on your medication. Can take a month or two to fully start working again. Keep up the good work in therapy.  - escitalopram (LEXAPRO) 10 MG tablet; Take 1 tablet (10 mg) by mouth daily.  Dispense: 90 tablet; Refill: 3  - propranolol (INDERAL) 20 MG tablet; Take 1 tablet (20 mg) by mouth 3 times daily as needed (anxiety).  Dispense: 90 tablet; Refill: 0    4. Immunization counseling  Your tests were negative for immunity - we can start this vaccine series at your next appointment.  - Hepatitis B surface antigen  - Hepatitis B Surface Antibody  - Hepatitis B core antibody      Follow up plan  Return in about 3 months (around 10/24/2025).    Thank you for coming to [facility] today.  Lab Testing:  **If you had lab testing today and your results are reassuring or normal they will be mailed to you or sent through [employer] within 7 days.   **If the lab tests need quick action we will call you with the results.  **If you are having labs done on a different day, please call [phone] to " schedule at [facility] or [phone] for other [facility] locations. Labs do not offer walk-in appointments.  The phone number we will call with results is # [phone] (home) . If this is not the best number please call our clinic and change the number.  Medication Refills:  If you need any refills please call your pharmacy and they will contact us.   If you need to  your refill at a new pharmacy, please contact the new pharmacy directly. The new pharmacy will help you get your medications transferred faster.   Scheduling:  If you have any concerns about today's visit or wish to schedule another appointment please call our office during normal business hours [phone] (8-5:00 M-F). If you can no longer make a scheduled visit, please cancel via [employer] or call us to cancel.   If a referral was made to an [facility] specialty provider and you do not get a call from central scheduling, please refer to directions on your visit summary or call our office during normal business hours for assistance.   If a Mammogram was ordered for you at the [facility] call [phone] to schedule or change your appointment.  If you had an XRay/CT/Ultrasound/MRI ordered the number is [phone] to schedule or change your radiology appointment.   [facility] has limited ultrasound appointments available on Wednesdays, if you would like your ultrasound at [facility], please call [phone] to schedule.   Medical Concerns:  If you have urgent medical concerns please call [phone] at any time of the day.    Maura Holder MD

## 2025-07-27 LAB — TESTOST SERPL-MCNC: 7 NG/DL (ref 8–950)
